# Patient Record
Sex: FEMALE | Race: WHITE | NOT HISPANIC OR LATINO | Employment: UNEMPLOYED | URBAN - METROPOLITAN AREA
[De-identification: names, ages, dates, MRNs, and addresses within clinical notes are randomized per-mention and may not be internally consistent; named-entity substitution may affect disease eponyms.]

---

## 2018-02-01 ENCOUNTER — HOSPITAL ENCOUNTER (EMERGENCY)
Facility: HOSPITAL | Age: 28
Discharge: HOME | End: 2018-02-01
Attending: EMERGENCY MEDICINE

## 2018-02-01 VITALS
TEMPERATURE: 98 F | HEIGHT: 63 IN | WEIGHT: 224 LBS | OXYGEN SATURATION: 100 % | HEART RATE: 102 BPM | SYSTOLIC BLOOD PRESSURE: 128 MMHG | DIASTOLIC BLOOD PRESSURE: 74 MMHG | RESPIRATION RATE: 20 BRPM | BODY MASS INDEX: 39.69 KG/M2

## 2018-02-01 DIAGNOSIS — L02.411 ABSCESS OF RIGHT AXILLA: Primary | ICD-10-CM

## 2018-02-01 LAB
B-HCG UR QL: NEGATIVE
CTP QC/QA: YES

## 2018-02-01 PROCEDURE — 99284 EMERGENCY DEPT VISIT MOD MDM: CPT | Mod: 25

## 2018-02-01 PROCEDURE — 81025 URINE PREGNANCY TEST: CPT | Performed by: EMERGENCY MEDICINE

## 2018-02-01 PROCEDURE — 25000003 PHARM REV CODE 250: Performed by: PHYSICIAN ASSISTANT

## 2018-02-01 PROCEDURE — 10061 I&D ABSCESS COMP/MULTIPLE: CPT

## 2018-02-01 RX ORDER — HYDROCODONE BITARTRATE AND ACETAMINOPHEN 5; 325 MG/1; MG/1
1 TABLET ORAL
Status: COMPLETED | OUTPATIENT
Start: 2018-02-01 | End: 2018-02-01

## 2018-02-01 RX ORDER — ONDANSETRON 4 MG/1
4 TABLET, ORALLY DISINTEGRATING ORAL
Status: COMPLETED | OUTPATIENT
Start: 2018-02-01 | End: 2018-02-01

## 2018-02-01 RX ORDER — SULFAMETHOXAZOLE AND TRIMETHOPRIM 800; 160 MG/1; MG/1
2 TABLET ORAL
Status: COMPLETED | OUTPATIENT
Start: 2018-02-01 | End: 2018-02-01

## 2018-02-01 RX ORDER — IBUPROFEN 600 MG/1
600 TABLET ORAL EVERY 6 HOURS PRN
Qty: 20 TABLET | Refills: 0 | Status: SHIPPED | OUTPATIENT
Start: 2018-02-01 | End: 2018-09-11

## 2018-02-01 RX ORDER — ONDANSETRON 4 MG/1
4 TABLET, ORALLY DISINTEGRATING ORAL EVERY 8 HOURS PRN
Qty: 15 TABLET | Refills: 0 | Status: SHIPPED | OUTPATIENT
Start: 2018-02-01 | End: 2018-09-11

## 2018-02-01 RX ORDER — HYDROCODONE BITARTRATE AND ACETAMINOPHEN 5; 325 MG/1; MG/1
1 TABLET ORAL EVERY 4 HOURS PRN
Qty: 12 TABLET | Refills: 0 | Status: SHIPPED | OUTPATIENT
Start: 2018-02-01 | End: 2018-02-11

## 2018-02-01 RX ORDER — SULFAMETHOXAZOLE AND TRIMETHOPRIM 800; 160 MG/1; MG/1
1 TABLET ORAL 2 TIMES DAILY
Qty: 12 TABLET | Refills: 0 | Status: SHIPPED | OUTPATIENT
Start: 2018-02-01 | End: 2018-02-07

## 2018-02-01 RX ORDER — LIDOCAINE HYDROCHLORIDE 10 MG/ML
10 INJECTION INFILTRATION; PERINEURAL
Status: COMPLETED | OUTPATIENT
Start: 2018-02-01 | End: 2018-02-01

## 2018-02-01 RX ADMIN — SULFAMETHOXAZOLE AND TRIMETHOPRIM 2 TABLET: 800; 160 TABLET ORAL at 06:02

## 2018-02-01 RX ADMIN — LIDOCAINE HYDROCHLORIDE 10 ML: 10 INJECTION, SOLUTION INFILTRATION; PERINEURAL at 06:02

## 2018-02-01 RX ADMIN — ONDANSETRON 4 MG: 4 TABLET, ORALLY DISINTEGRATING ORAL at 07:02

## 2018-02-01 RX ADMIN — HYDROCODONE BITARTRATE AND ACETAMINOPHEN 1 TABLET: 5; 325 TABLET ORAL at 06:02

## 2018-02-01 NOTE — ED PROVIDER NOTES
Encounter Date: 2/1/2018       History     Chief Complaint   Patient presents with    Abscess     Pt states abscess under right arm- large abscessn oted with pus under the skin, noticed 4 days ago, getting bigger and more painful, pt running fever.      Danya Chino 27 y.o. afebrile 27-year-old female with no reported past medical history presented to the ED with c/o abscess to the right axilla.  She states that she noted small painful region approximately 5 days ago.  She states since onset area has gradually increased in size and pain severity.  Does report some erythema to the right axilla in that pain is exacerbated with palpation and certain movements.  She does report due to pain she is unable to lower arm all the way as it exacerbates the pain but that she can complete this motion.  She denies any bleeding or drainage from the site.  She states minimal improvement with warm compress.  She does complain of subjective fever and chills however did not have any today.  She has not tried any medications for the symptoms.  She denies any previous history of skin abscesses.      The history is provided by the patient.     Review of patient's allergies indicates:  No Known Allergies  History reviewed. No pertinent past medical history.  No past surgical history on file.  History reviewed. No pertinent family history.  Social History   Substance Use Topics    Smoking status: Not on file    Smokeless tobacco: Not on file    Alcohol use Not on file     Review of Systems   Constitutional: Negative for chills and fever.   Cardiovascular: Negative for chest pain.   Gastrointestinal: Negative for nausea and vomiting.   Musculoskeletal: Negative for arthralgias, back pain and myalgias.   Skin: Positive for color change and wound. Negative for rash.        Abscess to the right axilla   Neurological: Negative for weakness and numbness.   Hematological: Does not bruise/bleed easily.       Physical Exam     Initial Vitals  [02/01/18 1639]   BP Pulse Resp Temp SpO2   128/74 102 20 98.4 °F (36.9 °C) 100 %      MAP       92         Physical Exam    Nursing note and vitals reviewed.  Constitutional: Vital signs are normal. She appears well-developed and well-nourished. She is cooperative.  Non-toxic appearance. She does not appear ill.   Mildly distressed due to pain   HENT:   Head: Normocephalic and atraumatic.   Eyes: Conjunctivae and lids are normal.   Neck: Neck supple. No neck rigidity.   Cardiovascular: Normal rate and regular rhythm.   Pulmonary/Chest: Breath sounds normal. No respiratory distress. She has no wheezes. She has no rhonchi.   Abdominal: Soft. Normal appearance. There is no rigidity.   Musculoskeletal: Normal range of motion.   Full passive range of motion of the right affected upper extremity.  3 cm area of erythema to the right axilla with central fluctuance and induration; purulent material noted just any skin with no active drainage, bleeding or extending erythema   Neurological: She is alert and oriented to person, place, and time. She has normal strength. No sensory deficit. GCS eye subscore is 4. GCS verbal subscore is 5. GCS motor subscore is 6.   Skin: Skin is warm, dry and intact. Abscess noted. No rash noted. There is erythema.   Psychiatric: She has a normal mood and affect. Her speech is normal and behavior is normal. Thought content normal.         ED Course   I & D - Incision and Drainage  Date/Time: 2/1/2018 7:03 PM  Location procedure was performed: [facility] EMERGENCY DEPARTMENT  Performed by: NIKOLAY NEVAREZ  Authorized by: HUE WEAVER   Assisting provider: HUE WEAVER  Pre-operative diagnosis: abscess  Post-operative diagnosis: abscess  Consent Done: Yes  Consent: Verbal consent obtained.  Risks and benefits: risks, benefits and alternatives were discussed  Consent given by: patient  Patient understanding: patient states understanding of the procedure being performed  Type:  abscess  Body area: upper extremity (right axilla)  Anesthesia: local infiltration    Anesthesia:  Local Anesthetic: lidocaine 1% without epinephrine  Anesthetic total: 2 mL  Patient sedated: no  Scalpel size: 11  Incision type: single straight  Complexity: complex  Drainage: purulent  Drainage amount: copious  Wound treatment: incision,  drainage,  deloculation,  wound left open and  wound packed  Packing material: 1/4 in gauze  Patient tolerance: Patient tolerated the procedure well with no immediate complications        Labs Reviewed   POCT URINE PREGNANCY   Danya Chino 27 y.o. afebrile 27-year-old female with no reported past medical history presented to the ED with c/o abscess to the right axilla.  She states that she noted small painful region approximately 5 days ago.  She states since onset area has gradually increased in size and pain severity.  Does report some erythema to the right axilla in that pain is exacerbated with palpation and certain movements.  She does report due to pain she is unable to lower arm all the way as it exacerbates the pain but that she can complete this motion.  She denies any bleeding or drainage from the site.  She states minimal improvement with warm compress.  She does complain of subjective fever and chills however did not have any today.  She has not tried any medications for the symptoms.  She denies any previous history of skin abscesses. ROS positive for abscess.  Physical exam reveals patient in some mild distress due to pain.  3 cm area of erythema to the right axilla with central fluctuance and induration; purulent material noted just any skin with no active drainage, bleeding or extending erythema.  FROM of all joints on affected extremities, sensation and capillary refill intact.     DDX: abscess, cellulitis, folliculitis, hydradenitis     ED management: wound cleaned I&D; see procedure note. We will send patient home with ABX, NSAIDs and short course of pain  medication.  She was instructed to remove packing in 2 days and follow-up with family practice for further evaluation.      Impression/Plan: The encounter diagnosis was Abscess of right axilla. discharged with Bactrim, Motrin and Norco Patient will follow up with Primary for wound check in 2-3 days.  Patient cautioned on when to return to ED.  Pt. Understands and agrees with current treatment plan                           Attending Attestation:     Physician Attestation Statement for NP/PA:   I discussed this assessment and plan of this patient with the NP/PA, but I did not personally examine the patient. The face to face encounter was performed by the NP/PA.                  ED Course      Clinical Impression:   The encounter diagnosis was Abscess of right axilla.    Disposition:   Disposition: Discharged  Condition: Stable                        KEVIN Santillan  02/01/18 1905       Mandy Kwong MD  02/01/18 1104

## 2018-08-13 ENCOUNTER — HOSPITAL ENCOUNTER (EMERGENCY)
Facility: HOSPITAL | Age: 28
Discharge: HOME | End: 2018-08-14
Attending: EMERGENCY MEDICINE
Payer: MEDICAID

## 2018-08-13 DIAGNOSIS — O20.0 THREATENED ABORTION IN SECOND TRIMESTER: Primary | ICD-10-CM

## 2018-08-13 DIAGNOSIS — N93.9 VAGINAL BLEEDING: ICD-10-CM

## 2018-08-13 LAB
B-HCG UR QL: POSITIVE
BASOPHILS # BLD AUTO: 0.01 K/UL
BASOPHILS NFR BLD: 0.1 %
BILIRUB UR QL STRIP: NEGATIVE
CLARITY UR: CLEAR
COLOR UR: YELLOW
CTP QC/QA: YES
DIFFERENTIAL METHOD: ABNORMAL
EOSINOPHIL # BLD AUTO: 0.2 K/UL
EOSINOPHIL NFR BLD: 1.9 %
ERYTHROCYTE [DISTWIDTH] IN BLOOD BY AUTOMATED COUNT: 14.4 %
GLUCOSE SERPL-MCNC: 119 MG/DL (ref 70–110)
GLUCOSE UR QL STRIP: NEGATIVE
HCT VFR BLD AUTO: 38.1 %
HGB BLD-MCNC: 12.4 G/DL
HGB UR QL STRIP: NEGATIVE
KETONES UR QL STRIP: NEGATIVE
LEUKOCYTE ESTERASE UR QL STRIP: NEGATIVE
LYMPHOCYTES # BLD AUTO: 2.1 K/UL
LYMPHOCYTES NFR BLD: 20.6 %
MCH RBC QN AUTO: 24.9 PG
MCHC RBC AUTO-ENTMCNC: 32.5 G/DL
MCV RBC AUTO: 77 FL
MONOCYTES # BLD AUTO: 0.6 K/UL
MONOCYTES NFR BLD: 5.5 %
NEUTROPHILS # BLD AUTO: 7.4 K/UL
NEUTROPHILS NFR BLD: 71.4 %
NITRITE UR QL STRIP: NEGATIVE
PH UR STRIP: 6 [PH] (ref 5–8)
PLATELET # BLD AUTO: 154 K/UL
PMV BLD AUTO: 11.1 FL
PROT UR QL STRIP: NEGATIVE
RBC # BLD AUTO: 4.97 M/UL
SP GR UR STRIP: 1.02 (ref 1–1.03)
URN SPEC COLLECT METH UR: NORMAL
UROBILINOGEN UR STRIP-ACNC: NEGATIVE EU/DL
WBC # BLD AUTO: 10.33 K/UL

## 2018-08-13 PROCEDURE — 82962 GLUCOSE BLOOD TEST: CPT

## 2018-08-13 PROCEDURE — 81003 URINALYSIS AUTO W/O SCOPE: CPT

## 2018-08-13 PROCEDURE — 81025 URINE PREGNANCY TEST: CPT | Performed by: EMERGENCY MEDICINE

## 2018-08-13 PROCEDURE — 85025 COMPLETE CBC W/AUTO DIFF WBC: CPT

## 2018-08-13 PROCEDURE — 86901 BLOOD TYPING SEROLOGIC RH(D): CPT

## 2018-08-13 PROCEDURE — 84702 CHORIONIC GONADOTROPIN TEST: CPT

## 2018-08-13 PROCEDURE — 99284 EMERGENCY DEPT VISIT MOD MDM: CPT | Mod: 25

## 2018-08-14 VITALS
BODY MASS INDEX: 42.52 KG/M2 | TEMPERATURE: 99 F | OXYGEN SATURATION: 99 % | HEART RATE: 100 BPM | SYSTOLIC BLOOD PRESSURE: 134 MMHG | DIASTOLIC BLOOD PRESSURE: 63 MMHG | RESPIRATION RATE: 20 BRPM | HEIGHT: 63 IN | WEIGHT: 240 LBS

## 2018-08-14 LAB
ABO + RH BLD: NORMAL
HCG INTACT+B SERPL-ACNC: NORMAL MIU/ML

## 2018-08-14 NOTE — ED NOTES
Patient changed into gown. Updated her on lab resulting times and let her know an Vericept tech would be called in.

## 2018-08-14 NOTE — ED PROVIDER NOTES
Encounter Date: 8/13/2018    SCRIBE #1 NOTE: I, Eliecer Leblanc, am scribing for, and in the presence of,  Dr. Derrick Larsen. I have scribed the entire note.       History     Chief Complaint   Patient presents with    Fall     Patient presents to the ED with reports of having falled while walking outside. States having felt dizzy. Reports landing on the right side of her body. Now complains of having abdominal pain with spotting. States having had a +positive home pregnancy test. No visit with OB/GYN.     Abdominal Pain     Danya Chino is a 28 y.o. female who  has no past medical history on file.    Patient presents to the ED due to slip and fall at [facility] in the last couple hours onto pavement with pink vaginal discharge later. She had a positive home pregnancy test two weeks prior, has not yet seen OB. Pt reports some mild myalgias immediately following the fall, but an hour later noticed some pinkish discharge while wiping after urinating. Currently experiencing cramping in the mid and lower abdomen. Pt is currently lightheaded, was lightheaded before fall as well. Pt had gestational diabetes during her last pregnancy.         The history is provided by the patient.     Review of patient's allergies indicates:  No Known Allergies  History reviewed. No pertinent past medical history.  History reviewed. No pertinent surgical history.  History reviewed. No pertinent family history.  Social History     Tobacco Use    Smoking status: Not on file   Substance Use Topics    Alcohol use: Not on file    Drug use: Not on file     Review of Systems   Genitourinary: Positive for vaginal bleeding.   Musculoskeletal: Positive for myalgias.        R sided   Neurological: Positive for dizziness and light-headedness.   All other systems reviewed and are negative.      Physical Exam     Initial Vitals [08/13/18 2216]   BP Pulse Resp Temp SpO2   136/79 (!) 111 20 98.6 °F (37 °C) 100 %      MAP       --         Physical  Exam    Nursing note and vitals reviewed.  Constitutional: She appears well-developed and well-nourished. No distress.   HENT:   Head: Normocephalic and atraumatic.   Eyes: Conjunctivae are normal.   Neck: Normal range of motion.   Cardiovascular: Normal rate, regular rhythm and normal heart sounds.   No murmur heard.  Pulmonary/Chest: Breath sounds normal. No respiratory distress.   Abdominal: Bowel sounds are normal. She exhibits no distension.   suprapubic and RLQ tenderness   Musculoskeletal: Normal range of motion.   mild R flank tenderness   Neurological: She is alert and oriented to person, place, and time.   Skin: Skin is warm and dry.   Psychiatric: She has a normal mood and affect. Her behavior is normal.         ED Course   Procedures  Labs Reviewed   CBC W/ AUTO DIFFERENTIAL - Abnormal; Notable for the following components:       Result Value    MCV 77 (*)     MCH 24.9 (*)     All other components within normal limits   POCT URINE PREGNANCY - Abnormal; Notable for the following components:    POC Preg Test, Ur Positive (*)     All other components within normal limits   POCT GLUCOSE MONITORING CONTINUOUS - Abnormal; Notable for the following components:    POC Glucose 119 (*)     All other components within normal limits   URINALYSIS   HCG, QUANTITATIVE, PREGNANCY   GROUP & RH          Imaging Results          US OB Limited 1 Or More Gestations (Final result)  Result time 08/14/18 00:30:09   Procedure changed from US OB Less Than 14 Wks First Gestation     Final result by Abhijeet Medina MD (08/14/18 00:30:09)                 Impression:      Single live intrauterine gestation corresponding to 15 weeks and 4 days with expected date of delivery of 01/31/2019.  No complications noted.      Electronically signed by: Abhijeet Medina MD  Date:    08/14/2018  Time:    00:30             Narrative:    EXAMINATION:  US OB LIMITED 1 OR MORE GESTATIONS    CLINICAL HISTORY:  pain;  Abnormal uterine and vaginal bleeding,  unspecified    TECHNIQUE:  Limited transabdominal pelvic ultrasound was performed.    COMPARISON:  None    FINDINGS:  There is a single live intrauterine gestation with current cephalic presentation.  The average ultrasound age is 15 weeks and 4 days.  The expected date of delivery is 2019.  The assessed fetal biometrics including the biparietal diameter, head circumference, abdominal circumference, and femur length are concordant.  The fetal weight is 127 g.  The fetal heart rate is 167 beats per minute.    The placenta is located anteriorly.  The placenta is normal in appearance.  The amniotic fluid appears adequate on visual assessment.  The cervix is closed.  The cervical length measures 3.9 cm.                                 Medical Decision Making:   Initial Assessment:   Patient is a 28 y.o. female presenting to ED with slip and fall with pinkish vaginal discharge developing, possibly pregnant.   Exam with suprapubic and RLQ tenderness, R flank tenderness.  Plan to obtain urine pregnancy, UA, glucose, orthostatics.  Will continue to monitor closely and reassess.    Clinical Tests:   Lab Tests: Ordered and Reviewed  Radiological Study: Ordered and Reviewed  ED Management:  28-year-old female who fell this evening.  Patient then noticed vaginal spotting.  UPT was positive and an ultrasound was done showing a 15 week 4 day IUP with no abnormalities on a good heart beat.  The patient is Rh positive. I have supplied up with information to follow up with an obstetrician as soon as able to establish care.                      Clinical Impression:   The primary encounter diagnosis was Threatened  in second trimester. A diagnosis of Vaginal bleeding was also pertinent to this visit.         I, Dr. Derrick Larsen, personally performed the services described in this documentation. All medical record entries made by the scribe were at my direction and in my presence. I have reviewed the chart and agree  that the record reflects my personal performance and is accurate and complete. Derrick Cash MD.  1:12 AM 08/14/2018                      Derrick Cash MD  08/14/18 0113

## 2018-08-14 NOTE — ED NOTES
Orthostatics    Lying /63    Sitting  /67    Stand  /61      Dizziness stated from sitting to standing

## 2018-08-14 NOTE — ED TRIAGE NOTES
Patient presents to the ED with complaints of a fall outside of [facility]. Patient stated there was water on the ground and that's what made her fall. Patient states she fell on her right side. Reports two episodes at home of light spotting. She is pregnant and has not have her first OB visit yet. This is her 3rd pregnancy with the other 2 being healthy deliveries. As a result of the fall patient states she has been having intermittent cramping and some dizziness. She states she started to feel dizzy before the fall when she found out she was pregnant. Patient started on prenatal vitamins 2 days ago. Patient requests an US.     Review of patient's allergies indicates:  No Known Allergies     Patient has verified the spelling of their name and  on armband.   APPEARANCE: Patient is alert, calm, oriented x 4, and does not appear distressed.  HEENT: intermittent dizziness that started when she found out she was pregnant  SKIN: Skin is normal for race, warm, and dry. Normal skin turgor and mucous membranes moist.  CARDIAC: Normal rate and rhythm, no murmur heard.   RESPIRATORY:Normal rate and effort. Breath sounds clear bilaterally throughout chest. Respirations are equal and unlabored.    GASTRO: Bowel sounds normal, abdomen is soft, no tenderness, and no abdominal distention.  MUSCLE: Full ROM. No bony tenderness or soft tissue tenderness. No obvious deformity. Intermittent lower and upper mid abdominal cramping +unknown amount of weeks pregnant

## 2018-09-11 ENCOUNTER — OFFICE VISIT (OUTPATIENT)
Dept: OBSTETRICS AND GYNECOLOGY | Facility: CLINIC | Age: 28
End: 2018-09-11
Payer: MEDICAID

## 2018-09-11 ENCOUNTER — LAB VISIT (OUTPATIENT)
Dept: LAB | Facility: HOSPITAL | Age: 28
End: 2018-09-11
Attending: OBSTETRICS & GYNECOLOGY
Payer: MEDICAID

## 2018-09-11 VITALS — WEIGHT: 258.38 LBS | BODY MASS INDEX: 45.78 KG/M2 | HEIGHT: 63 IN

## 2018-09-11 DIAGNOSIS — Z32.00 ENCOUNTER FOR CONFIRMATION OF PREGNANCY TEST RESULT WITH PHYSICAL EXAMINATION: ICD-10-CM

## 2018-09-11 DIAGNOSIS — Z3A.19 19 WEEKS GESTATION OF PREGNANCY: Primary | ICD-10-CM

## 2018-09-11 DIAGNOSIS — Z36.3 ENCOUNTER FOR ROUTINE SCREENING FOR MALFORMATION USING ULTRASONICS: ICD-10-CM

## 2018-09-11 DIAGNOSIS — Z3A.19 19 WEEKS GESTATION OF PREGNANCY: ICD-10-CM

## 2018-09-11 LAB
ABO + RH BLD: NORMAL
BASOPHILS # BLD AUTO: 0.01 K/UL
BASOPHILS NFR BLD: 0.1 %
BLD GP AB SCN CELLS X3 SERPL QL: NORMAL
DIFFERENTIAL METHOD: ABNORMAL
EOSINOPHIL # BLD AUTO: 0.2 K/UL
EOSINOPHIL NFR BLD: 2.2 %
ERYTHROCYTE [DISTWIDTH] IN BLOOD BY AUTOMATED COUNT: 14.6 %
ESTIMATED AVG GLUCOSE: 117 MG/DL
GLUCOSE SERPL-MCNC: 109 MG/DL
HBA1C MFR BLD HPLC: 5.7 %
HCT VFR BLD AUTO: 38.2 %
HGB BLD-MCNC: 12.5 G/DL
LYMPHOCYTES # BLD AUTO: 1.8 K/UL
LYMPHOCYTES NFR BLD: 17.7 %
MCH RBC QN AUTO: 24.9 PG
MCHC RBC AUTO-ENTMCNC: 32.7 G/DL
MCV RBC AUTO: 76 FL
MONOCYTES # BLD AUTO: 0.5 K/UL
MONOCYTES NFR BLD: 4.5 %
NEUTROPHILS # BLD AUTO: 7.7 K/UL
NEUTROPHILS NFR BLD: 74.5 %
PLATELET # BLD AUTO: 190 K/UL
PMV BLD AUTO: 10.9 FL
RBC # BLD AUTO: 5.02 M/UL
TSH SERPL DL<=0.005 MIU/L-ACNC: 0.8 UIU/ML
WBC # BLD AUTO: 10.34 K/UL

## 2018-09-11 PROCEDURE — 36415 COLL VENOUS BLD VENIPUNCTURE: CPT

## 2018-09-11 PROCEDURE — 85025 COMPLETE CBC W/AUTO DIFF WBC: CPT

## 2018-09-11 PROCEDURE — 87340 HEPATITIS B SURFACE AG IA: CPT

## 2018-09-11 PROCEDURE — 83036 HEMOGLOBIN GLYCOSYLATED A1C: CPT

## 2018-09-11 PROCEDURE — 86592 SYPHILIS TEST NON-TREP QUAL: CPT

## 2018-09-11 PROCEDURE — 86850 RBC ANTIBODY SCREEN: CPT

## 2018-09-11 PROCEDURE — 86703 HIV-1/HIV-2 1 RESULT ANTBDY: CPT

## 2018-09-11 PROCEDURE — 84443 ASSAY THYROID STIM HORMONE: CPT

## 2018-09-11 PROCEDURE — 99204 OFFICE O/P NEW MOD 45 MIN: CPT | Mod: TH,S$PBB,, | Performed by: OBSTETRICS & GYNECOLOGY

## 2018-09-11 PROCEDURE — 99999 PR PBB SHADOW E&M-EST. PATIENT-LVL II: CPT | Mod: PBBFAC,,, | Performed by: OBSTETRICS & GYNECOLOGY

## 2018-09-11 PROCEDURE — 82947 ASSAY GLUCOSE BLOOD QUANT: CPT

## 2018-09-11 PROCEDURE — 86762 RUBELLA ANTIBODY: CPT

## 2018-09-11 PROCEDURE — 99212 OFFICE O/P EST SF 10 MIN: CPT | Mod: PBBFAC,PO,25 | Performed by: OBSTETRICS & GYNECOLOGY

## 2018-09-11 NOTE — PROGRESS NOTES
"28 y.o.   OB History      Para Term  AB Living    1              SAB TAB Ectopic Multiple Live Births                     Comlaining of: new pregnancy  unkown lmp  lucita from us in ED,   Baby 1 vag, 6.5 my  Baby 2, emebergency section for fhr, gest dm , 6.5 my    pmh neg, on gest dm diet only  psh C secotn x 1  Ros neg  Sh neg        ROS:  GENERAL: No fever, chills, fatigability or weight loss.  SKIN: No rashes, itching or changes in color or texture of skin.  HEAD: No headaches or recent head trauma.  EYES: Visual acuity fine. No photophobia, ocular pain or diplopia.  EARS: Denies ear pain, discharge or vertigo.  NOSE: No loss of smell, no epistaxis or postnasal drip.  MOUTH & THROAT: No hoarseness or change in voice. No excessive gum bleeding.  NODES: Denies swollen glands.  CHEST: Denies DALY, cyanosis, wheezing, cough and sputum production.  CARDIOVASCULAR: Denies chest pain, PND, orthopnea or reduced exercise tolerance.  ABDOMEN: Appetite fine. No weight loss. Denies diarrhea, abdominal pain, hematemesis or blood in stool.  URINARY: No flank pain, dysuria or hematuria.  PERIPHERAL VASCULAR: No claudication or cyanosis.  MUSCULOSKELETAL: No joint stiffness or swelling. Denies back pain.  NEUROLOGIC: No history of seizures, paralysis, alteration of gait or coordination      PE: Ht 5' 3" (1.6 m)   Wt 117.2 kg (258 lb 6.1 oz)   BMI 45.77 kg/m²    Head/neck normal  abd soft  fht good  Fh 20  extr normal    A new pregnancy  Plan, discussed prenatal care  counselled on diet/excer  pnv  Lab,        "

## 2018-09-12 ENCOUNTER — TELEPHONE (OUTPATIENT)
Dept: OBSTETRICS AND GYNECOLOGY | Facility: CLINIC | Age: 28
End: 2018-09-12

## 2018-09-12 LAB
HBV SURFACE AG SERPL QL IA: NEGATIVE
HIV 1+2 AB+HIV1 P24 AG SERPL QL IA: NEGATIVE
RPR SER QL: NORMAL
RUBV IGG SER-ACNC: 35.4 IU/ML
RUBV IGG SER-IMP: REACTIVE

## 2018-09-12 NOTE — TELEPHONE ENCOUNTER
Tried contacting patient. A male answered the phone and stated that she was unable to speak but would give a message to call back.

## 2018-09-12 NOTE — TELEPHONE ENCOUNTER
----- Message from Hanane Bowles sent at 9/12/2018  2:20 PM CDT -----  Contact: Self/ [phone]  Patient called in today expressing her concerns with her office visit yesterday.   She stated she was not told how much she weights, status of the baby and wasn't given a follow up appointment. I apologized to the patient and offered to schedule a follow up appointment.     Patient stated she wants a follow up appointment this week and not 3 weeks later. She also said she was informed by the doctor that she needed a anatomy ultrasound but no one scheduled her a appointment.    Please call patient to schedule a sooner appointment. She is looking to schedule a follow up appointment and ultrasound. She is 5 months pregnant.

## 2018-09-14 ENCOUNTER — TELEPHONE (OUTPATIENT)
Dept: OBSTETRICS AND GYNECOLOGY | Facility: CLINIC | Age: 28
End: 2018-09-14

## 2018-09-14 NOTE — TELEPHONE ENCOUNTER
----- Message from Saba Brothers sent at 9/14/2018  1:39 PM CDT -----  Contact: Self [phone]  Patient is calling to see when does she have to follow up for a Dr's visit and for her ultrasound since she is 5 months. Please advice

## 2018-09-14 NOTE — TELEPHONE ENCOUNTER
Spoke to pt who could not understand why she was not scheduled for her ultrasound immediately after her appointment. She feels like she should have left with paperwork and information and a due date. I tried to advise pt that we cannot know a due date without an ultrasound. Pt couldn't understand so pt was scheduled for 9/19 at 7:30AM for u/s

## 2018-09-20 ENCOUNTER — TELEPHONE (OUTPATIENT)
Dept: OBSTETRICS AND GYNECOLOGY | Facility: CLINIC | Age: 28
End: 2018-09-20

## 2018-09-20 ENCOUNTER — PROCEDURE VISIT (OUTPATIENT)
Dept: OBSTETRICS AND GYNECOLOGY | Facility: CLINIC | Age: 28
End: 2018-09-20
Payer: MEDICAID

## 2018-09-20 DIAGNOSIS — O99.212 MATERNAL OBESITY, ANTEPARTUM, SECOND TRIMESTER: Primary | ICD-10-CM

## 2018-09-20 DIAGNOSIS — Z36.3 ANTENATAL SCREENING FOR MALFORMATION USING ULTRASONICS: ICD-10-CM

## 2018-09-20 DIAGNOSIS — Z3A.20 20 WEEKS GESTATION OF PREGNANCY: Primary | ICD-10-CM

## 2018-09-20 DIAGNOSIS — Z3A.19 19 WEEKS GESTATION OF PREGNANCY: ICD-10-CM

## 2018-09-20 PROCEDURE — 76805 OB US >/= 14 WKS SNGL FETUS: CPT | Mod: PBBFAC,PO

## 2018-09-20 PROCEDURE — 76805 OB US >/= 14 WKS SNGL FETUS: CPT | Mod: 26,S$PBB,, | Performed by: OBSTETRICS & GYNECOLOGY

## 2018-09-20 NOTE — TELEPHONE ENCOUNTER
Tell her I looked at the us report, we are going to use the due date from the us done already in the ED, that's jan 31  Make appt for her in 3 weeks  But I want her to go sometime next week to get all of her blood work so we can have it when she comes back  thanks

## 2018-10-11 ENCOUNTER — PROCEDURE VISIT (OUTPATIENT)
Dept: OBSTETRICS AND GYNECOLOGY | Facility: CLINIC | Age: 28
End: 2018-10-11
Payer: MEDICAID

## 2018-10-11 ENCOUNTER — ROUTINE PRENATAL (OUTPATIENT)
Dept: OBSTETRICS AND GYNECOLOGY | Facility: CLINIC | Age: 28
End: 2018-10-11
Payer: MEDICAID

## 2018-10-11 VITALS
BODY MASS INDEX: 45.83 KG/M2 | SYSTOLIC BLOOD PRESSURE: 133 MMHG | DIASTOLIC BLOOD PRESSURE: 74 MMHG | WEIGHT: 258.69 LBS

## 2018-10-11 DIAGNOSIS — Z3A.24 24 WEEKS GESTATION OF PREGNANCY: ICD-10-CM

## 2018-10-11 DIAGNOSIS — O99.810 ABNORMAL GLUCOSE TOLERANCE TEST IN PREGNANCY: Primary | ICD-10-CM

## 2018-10-11 DIAGNOSIS — Z3A.20 20 WEEKS GESTATION OF PREGNANCY: ICD-10-CM

## 2018-10-11 PROCEDURE — 99213 OFFICE O/P EST LOW 20 MIN: CPT | Mod: S$PBB,TH,25, | Performed by: OBSTETRICS & GYNECOLOGY

## 2018-10-11 PROCEDURE — 99999 PR PBB SHADOW E&M-EST. PATIENT-LVL II: CPT | Mod: PBBFAC,,, | Performed by: OBSTETRICS & GYNECOLOGY

## 2018-10-11 PROCEDURE — 76816 OB US FOLLOW-UP PER FETUS: CPT | Mod: 26,S$PBB,, | Performed by: OBSTETRICS & GYNECOLOGY

## 2018-10-11 PROCEDURE — 76816 OB US FOLLOW-UP PER FETUS: CPT | Mod: PBBFAC,PO

## 2018-10-11 PROCEDURE — 99212 OFFICE O/P EST SF 10 MIN: CPT | Mod: PBBFAC,TH,PO | Performed by: OBSTETRICS & GYNECOLOGY

## 2018-10-25 ENCOUNTER — TELEPHONE (OUTPATIENT)
Dept: OBSTETRICS AND GYNECOLOGY | Facility: CLINIC | Age: 28
End: 2018-10-25

## 2018-10-25 NOTE — TELEPHONE ENCOUNTER
----- Message from Katerine Chino sent at 10/25/2018  3:30 PM CDT -----  Contact: [phone]/self  Patient requesting to speak with you regarding her glucose test and next appt. Please advise.

## 2018-11-05 ENCOUNTER — TELEPHONE (OUTPATIENT)
Dept: OBSTETRICS AND GYNECOLOGY | Facility: CLINIC | Age: 28
End: 2018-11-05

## 2018-11-05 ENCOUNTER — HOSPITAL ENCOUNTER (OUTPATIENT)
Facility: HOSPITAL | Age: 28
Discharge: HOME | End: 2018-11-05
Attending: OBSTETRICS & GYNECOLOGY | Admitting: OBSTETRICS & GYNECOLOGY
Payer: MEDICAID

## 2018-11-05 VITALS
OXYGEN SATURATION: 100 % | BODY MASS INDEX: 45.71 KG/M2 | HEIGHT: 63 IN | WEIGHT: 258 LBS | DIASTOLIC BLOOD PRESSURE: 87 MMHG | HEART RATE: 104 BPM | SYSTOLIC BLOOD PRESSURE: 145 MMHG | TEMPERATURE: 99 F

## 2018-11-05 DIAGNOSIS — Z36.89 ENCOUNTER FOR TRIAGE IN PREGNANT PATIENT: ICD-10-CM

## 2018-11-05 LAB
BACTERIA #/AREA URNS HPF: ABNORMAL /HPF
BASOPHILS # BLD AUTO: 0.01 K/UL
BASOPHILS NFR BLD: 0.1 %
BILIRUB UR QL STRIP: NEGATIVE
CLARITY UR: CLEAR
COLOR UR: ABNORMAL
DIFFERENTIAL METHOD: ABNORMAL
EOSINOPHIL # BLD AUTO: 0 K/UL
EOSINOPHIL NFR BLD: 0.5 %
ERYTHROCYTE [DISTWIDTH] IN BLOOD BY AUTOMATED COUNT: 14.3 %
FLUAV AG SPEC QL IA: NEGATIVE
FLUBV AG SPEC QL IA: NEGATIVE
GLUCOSE UR QL STRIP: NEGATIVE
HCT VFR BLD AUTO: 35.5 %
HGB BLD-MCNC: 11.2 G/DL
HGB UR QL STRIP: ABNORMAL
KETONES UR QL STRIP: NEGATIVE
LEUKOCYTE ESTERASE UR QL STRIP: NEGATIVE
LYMPHOCYTES # BLD AUTO: 1.3 K/UL
LYMPHOCYTES NFR BLD: 17.2 %
MCH RBC QN AUTO: 24.4 PG
MCHC RBC AUTO-ENTMCNC: 31.5 G/DL
MCV RBC AUTO: 77 FL
MICROSCOPIC COMMENT: ABNORMAL
MONOCYTES # BLD AUTO: 0.4 K/UL
MONOCYTES NFR BLD: 5.4 %
NEUTROPHILS # BLD AUTO: 5.6 K/UL
NEUTROPHILS NFR BLD: 75.7 %
NITRITE UR QL STRIP: NEGATIVE
PH UR STRIP: 6 [PH] (ref 5–8)
PLATELET # BLD AUTO: 164 K/UL
PMV BLD AUTO: 10.5 FL
PROT UR QL STRIP: NEGATIVE
RBC # BLD AUTO: 4.59 M/UL
RBC #/AREA URNS HPF: 8 /HPF (ref 0–4)
SP GR UR STRIP: >=1.03 (ref 1–1.03)
SPECIMEN SOURCE: NORMAL
SQUAMOUS #/AREA URNS HPF: 12 /HPF
URN SPEC COLLECT METH UR: ABNORMAL
UROBILINOGEN UR STRIP-ACNC: NEGATIVE EU/DL
WBC # BLD AUTO: 7.39 K/UL
WBC #/AREA URNS HPF: 1 /HPF (ref 0–5)

## 2018-11-05 PROCEDURE — 99219 PR INITIAL OBSERVATION CARE,LEVL II: CPT | Mod: ,,, | Performed by: OBSTETRICS & GYNECOLOGY

## 2018-11-05 PROCEDURE — 36415 COLL VENOUS BLD VENIPUNCTURE: CPT

## 2018-11-05 PROCEDURE — 99211 OFF/OP EST MAY X REQ PHY/QHP: CPT

## 2018-11-05 PROCEDURE — 81000 URINALYSIS NONAUTO W/SCOPE: CPT

## 2018-11-05 PROCEDURE — 87400 INFLUENZA A/B EACH AG IA: CPT

## 2018-11-05 PROCEDURE — 25000003 PHARM REV CODE 250: Performed by: OBSTETRICS & GYNECOLOGY

## 2018-11-05 PROCEDURE — 85025 COMPLETE CBC W/AUTO DIFF WBC: CPT

## 2018-11-05 RX ORDER — NITROFURANTOIN 25; 75 MG/1; MG/1
100 CAPSULE ORAL ONCE
Status: COMPLETED | OUTPATIENT
Start: 2018-11-05 | End: 2018-11-05

## 2018-11-05 RX ADMIN — NITROFURANTOIN (MONOHYDRATE/MACROCRYSTALS) 100 MG: 75; 25 CAPSULE ORAL at 09:11

## 2018-11-05 NOTE — TELEPHONE ENCOUNTER
----- Message from Hanane Bowles sent at 11/5/2018 12:16 PM CST -----  Contact: Self/ [phone]  Patient asked to be seen today. She stated she thinks she's 7 months pregnant and has been running a fever since last night.    Please call.

## 2018-11-05 NOTE — TELEPHONE ENCOUNTER
Pt states since last night she has been having fever of 102 and chills. She has been getting pressure in her legs and in her abdomen. Pt advised to go tot he ED

## 2018-11-06 ENCOUNTER — TELEPHONE (OUTPATIENT)
Dept: OBSTETRICS AND GYNECOLOGY | Facility: CLINIC | Age: 28
End: 2018-11-06

## 2018-11-06 RX ORDER — NITROFURANTOIN 25; 75 MG/1; MG/1
100 CAPSULE ORAL 2 TIMES DAILY
Qty: 10 CAPSULE | Refills: 1 | Status: SHIPPED | OUTPATIENT
Start: 2018-11-06 | End: 2018-11-20

## 2018-11-06 NOTE — CARE UPDATE
: Patient is a  @ 37.4 weeks IUP. Arrived to unit c/o fever with no symptoms and abdominal cramps. VSS, NAD.    : Patient's cervix is closed.    : Dr. Wiedemann at bedside.     : Urine collected for u/a. CBC ordered.    : Order for rapid flu swab per Dr. Wiedemann.    : Rapid flu collected and sent.    : Dr. Wiedemann updated on patient status. Ordered macrobid 100 mg x1. Patient can  rx for macrobid tomorrow. Orders to discharge patient.    : Patient given macrobid per MAR.    : Rapid flu test negative.     : Discharge instructions given to patient via printed materials and verbal discussion. Patient verbalized understanding. VSS, NAD. Patient leaving unit via ambulation.

## 2018-11-06 NOTE — PROGRESS NOTES
27 y/o  at 27-4, presents to [facility] fever, irreg crmaps, no bleeding or lof.  Baby is active, last visit oct 11, uncomplicated prenatal care,   No cough, some aches, nobody else with fever in household  No diarrhea or dysuria    pe vss, t 99  Pt does not look ill  Head/neck normal  abd gravid, nontender all quadrants, no rebound  nontender over section scar  extr normal  Pelvic per rn, high, post, closed, no fluid or blood    Monitor shows reactive fhr with accelerations per protocol for reactivity  No decels  No reg contractions    Assessment, poss viral illness  Plan, cbc, send ua, oral hydrate

## 2018-11-13 ENCOUNTER — LAB VISIT (OUTPATIENT)
Dept: LAB | Facility: HOSPITAL | Age: 28
End: 2018-11-13
Attending: OBSTETRICS & GYNECOLOGY
Payer: MEDICAID

## 2018-11-13 ENCOUNTER — TELEPHONE (OUTPATIENT)
Dept: OBSTETRICS AND GYNECOLOGY | Facility: CLINIC | Age: 28
End: 2018-11-13

## 2018-11-13 DIAGNOSIS — Z3A.24 24 WEEKS GESTATION OF PREGNANCY: ICD-10-CM

## 2018-11-13 LAB
GLUCOSE SERPL-MCNC: 118 MG/DL
GLUCOSE SERPL-MCNC: 149 MG/DL
GLUCOSE SERPL-MCNC: 216 MG/DL
GLUCOSE SERPL-MCNC: 216 MG/DL

## 2018-11-13 PROCEDURE — 82951 GLUCOSE TOLERANCE TEST (GTT): CPT

## 2018-11-13 PROCEDURE — 82952 GTT-ADDED SAMPLES: CPT

## 2018-11-13 PROCEDURE — 36415 COLL VENOUS BLD VENIPUNCTURE: CPT

## 2018-11-13 NOTE — TELEPHONE ENCOUNTER
----- Message from Saba Brothers sent at 11/13/2018 10:15 AM CST -----  Contact: Self [phone]  Patient is 7 month pregnant and missed her appointment today and she states if she can still come in. Please advice

## 2018-11-15 ENCOUNTER — TELEPHONE (OUTPATIENT)
Dept: OBSTETRICS AND GYNECOLOGY | Facility: CLINIC | Age: 28
End: 2018-11-15

## 2018-11-15 DIAGNOSIS — O24.419 GESTATIONAL DIABETES MELLITUS (GDM) IN THIRD TRIMESTER, GESTATIONAL DIABETES METHOD OF CONTROL UNSPECIFIED: Primary | ICD-10-CM

## 2018-11-15 NOTE — TELEPHONE ENCOUNTER
Her fasting and 1, 2 hr glucose were elevated, needs to see mfm soon!, even if has to go to [place] for eval/consult for gest DM

## 2018-11-16 ENCOUNTER — TELEPHONE (OUTPATIENT)
Dept: OBSTETRICS AND GYNECOLOGY | Facility: CLINIC | Age: 28
End: 2018-11-16

## 2018-11-16 ENCOUNTER — TELEPHONE (OUTPATIENT)
Dept: MATERNAL FETAL MEDICINE | Facility: CLINIC | Age: 28
End: 2018-11-16

## 2018-11-16 NOTE — TELEPHONE ENCOUNTER
"Contacted patient to schedule M consult for GDM. Patient initially stated that she was not sure "why MFM was contacting her." Informed patient that RN would reach out to the OB office so that patient can discuss results with her provider. Message sent to the OB provider office, patient was contacted and then message received by MFM that patient waiting for call to schedule consult.    Offered patient appointment at [facility], discussed the location and patient declined stating "that is too far away." RN then offered patient the next available consult appointment with MFM at [facility] location on 11/29/18 and patient stated that she "did not know if that is where I go to the doctor." Reviewed the [place] address with the patient at which point she said, "I do not know if that is it or where that is." Patient asking "why I need to wait 2 weeks for an appointment." RN mentioned coming to the [facility] location next week and patient declined. Patient concerned about the possibility of her blood sugar "being high right now." Informed patient that RN will reach out to the on-call for her OB's group and patient said "no, don't send anymore messages." Patient eventually scheduled the consult at [facility] on 11/29/18 for 1040am, encouraged patient to bring in her BS log.    Pt verbalized understanding of information.    "

## 2018-11-16 NOTE — TELEPHONE ENCOUNTER
"----- Message from Sheeba Grace RN sent at 11/16/2018  3:25 PM CST -----  Manny Sotelo,    I contacted the patient to schedule her MFM consult for GDM and pt was upset said "she has not spoken to her doctor, did not know these results and people are calling her to schedule things she does not know anything about." Can someone from your office call the patient and explain. I told her I would call back later and schedule a consult.    Sheeba Griffiths  "

## 2018-11-16 NOTE — TELEPHONE ENCOUNTER
I sent a message to Dr. Wiedemann to see when he want her to come in. Dr. Cancino usually schedule 4 weeks visits until they are further along. i'm going to see if Dr. Cancino want her to come in next week.

## 2018-11-19 ENCOUNTER — TELEPHONE (OUTPATIENT)
Dept: OBSTETRICS AND GYNECOLOGY | Facility: CLINIC | Age: 28
End: 2018-11-19

## 2018-11-19 NOTE — TELEPHONE ENCOUNTER
----- Message from Fabiola Louise sent at 11/19/2018  1:59 PM CST -----  Contact: Self [phone]  Patient would like to speak with you about her appointment tomorrow. Please advise

## 2018-11-20 ENCOUNTER — ROUTINE PRENATAL (OUTPATIENT)
Dept: OBSTETRICS AND GYNECOLOGY | Facility: CLINIC | Age: 28
End: 2018-11-20
Payer: MEDICAID

## 2018-11-20 VITALS
SYSTOLIC BLOOD PRESSURE: 114 MMHG | WEIGHT: 263.56 LBS | BODY MASS INDEX: 46.69 KG/M2 | DIASTOLIC BLOOD PRESSURE: 66 MMHG

## 2018-11-20 DIAGNOSIS — Z3A.29 29 WEEKS GESTATION OF PREGNANCY: Primary | ICD-10-CM

## 2018-11-20 PROCEDURE — 99999 PR PBB SHADOW E&M-EST. PATIENT-LVL II: CPT | Mod: PBBFAC,,, | Performed by: OBSTETRICS & GYNECOLOGY

## 2018-11-20 PROCEDURE — 99213 OFFICE O/P EST LOW 20 MIN: CPT | Mod: S$PBB,TH,, | Performed by: OBSTETRICS & GYNECOLOGY

## 2018-11-20 PROCEDURE — 99212 OFFICE O/P EST SF 10 MIN: CPT | Mod: PBBFAC,PO | Performed by: OBSTETRICS & GYNECOLOGY

## 2018-11-20 NOTE — PROGRESS NOTES
Baby active, fht good, 145, fh 30  Pt states she is watching her sugars, has appt Monday with diabetic RN  Then wed with mfm  Fu 2 weeks, appt given  Some mild discomfort R side low, prob rd lig or section scar  Not acute  Discussed tubal ligatoin, pt not sure as of yet  Her partner does't want it done,

## 2018-11-27 ENCOUNTER — CLINICAL SUPPORT (OUTPATIENT)
Dept: DIABETES | Facility: CLINIC | Age: 28
End: 2018-11-27
Payer: MEDICAID

## 2018-11-27 VITALS — WEIGHT: 260 LBS | BODY MASS INDEX: 46.06 KG/M2

## 2018-11-27 DIAGNOSIS — O24.410 DIET CONTROLLED GESTATIONAL DIABETES MELLITUS (GDM) IN THIRD TRIMESTER: ICD-10-CM

## 2018-11-27 PROCEDURE — 99211 OFF/OP EST MAY X REQ PHY/QHP: CPT | Mod: PBBFAC,PO | Performed by: REGISTERED NURSE

## 2018-11-27 PROCEDURE — 99999 PR PBB SHADOW E&M-EST. PATIENT-LVL I: CPT | Mod: PBBFAC,,,

## 2018-11-27 PROCEDURE — G0108 DIAB MANAGE TRN  PER INDIV: HCPCS | Mod: PBBFAC,PO | Performed by: REGISTERED NURSE

## 2018-11-27 NOTE — PROGRESS NOTES
Diabetes Education  Author: Aunel Menendez RN  Date: 11/27/2018    Diabetes Care Management Summary  Diabetes Education Record Assessment/Progress: Initial  Current Diabetes Risk Level: Moderate   Last A1c:   Lab Results   Component Value Date    HGBA1C 5.7 (H) 09/11/2018     Last visit with Diabetes Educator: : 11/27/2018  Diabetes Type  Diabetes Type : Gestational  Diabetes History  Current Treatment: Diet, Exercise  Reviewed Problem List with Patient: No  Health Maintenance was reviewed today with patient. Discussed with patient importance of routine eye exams, foot exams/foot care, blood work (i.e.: A1c, microalbumin, and lipid), dental visits, yearly flu vaccine, and pneumonia vaccine as indicated by PCP. Patient verbalized understanding.     The patient has no Health Maintenance topics of status Not Due  Health Maintenance Due   Topic Date Due    Lipid Panel  1990    TETANUS VACCINE  07/20/2008    Pap Smear  07/20/2011    Influenza Vaccine  08/01/2018   Nutrition  Meal Planning: 3 meals per day, snacks between meal  What type of beverages do you drink?: water  Meal Plan 24 Hour Recall - Breakfast: eggs, milk  Meal Plan 24 Hour Recall - Lunch: turkey and cheese sandwich, 1/2 orange  Meal Plan 24 Hour Recall - Dinner: pasta, milk  Monitoring   Self Monitoring : pt was given a one touch verio flex meter and instructed in its use. Instructed pt to test 4 times a day - fasting in the am and 2 hours after breakfast, lunch and dinner. Instructed pt on the normal bs ranges. instructed pt to keep a record of her bs's and to bring the record to her md visits. Pt will fu with md to get testing supplies ordered.  Do you use a personal continuous glucose monitor?: No  In the last month, how often have you had a low blood sugar reaction?: more than once a week  What are your symptoms of low blood sugar?: sweating, dizzy  How do you treat low blood sugar?: eats something or drinks water  Can you tell when your  blood sugar is too high?: no  Exercise   Exercise Type: none  Current Diabetes Treatment   Current Treatment: Diet, Exercise  Social History  Preferred Learning Method: Face to Face, Demonstration, Reading Materials  Primary Support: Self  Educational Level: Middle School  Smoking Status: Never a Smoker  Alcohol Use: Never  DDS-2 Score  ( > 3 = SIGNIFICANT DISTRESS): 1   Barriers to Change  Barriers to Change: None  Learning Challenges : Literacy  Readiness to Learn   Readiness to Learn : Acceptance  Cultural Influences  Cultural Influences: None  Diabetes Education Assessment/Progress  Diabetes Disease Process (diabetes disease process and treatment options): Discussion, Instructed, Demonstrates Understanding/Competency(verbalizes/demonstrates), Individual Session, Written Materials Provided  Nutrition (Incorporating nutritional management into one's lifestyle): Discussion, Instructed, Demonstrates Understanding/Competency (verbalizes/demonstrates), Demonstration, Individual Session, Written Materials Provided  Physical Activity (incorporating physical activity into one's lifestyle): Discussion, Instructed, Demonstrates Understanding/Competency (verbalizes/demonstrates), Individual Session  Medications (states correct name, dose, onset, peak, duration, side effects & timing of meds): Not Covered/Deferred  Monitoring (monitoring blood glucose/other parameters & using results): Discussion, Demonstration, Instructed, Individual Session, Written Materials Provided, Demonstrates Understanding/Competency (verbalizes/demonstrates)  Acute Complications (preventing, detecting, and treating acute complications): Not Covered/Deferred  Chronic Complications (preventing, detecting, and treating chronic complications): Not Covered/Deferred  Clinical (diabetes, other pertinent medical history, and relevant comorbidities reviewed during visit): Discussion, Instructed, Demonstrates Understanding/Competency  (verbalizes/demonstrates), Individual Session  Cognitive (knowledge of self-management skills, functional health literacy): Discussion, Instructed, Demonstrates Understanding/Competency (verbalizes/demonstrates), Individual Session  Psychosocial (emotional response to diabetes): Discussion, Instructed, Demonstrates Understanding/Competency (verbalizes/demonstrates), Individual Session  Diabetes Distress and Support Systems: Discussion, Instructed, Demonstrates Understanding/Competency (verbalizes/demonstrates), Individual Session  Behavioral (readiness for change, lifestyle practices, self-care behaviors): Discussion, Instructed, Demonstrates Understanding/Competency (verbalizes/demonstrates), Individual Session  Goals  Patient has selected/evaluated goals during today's session: No  Diabetes Meal Plan  Restrictions: Restricted Carbohydrate  Calories: 2000  Carbohydrate Per Meal: 45-60g  Carbohydrate Per Snack : 15-30g  Pt came to clinic for gdm education. Pt had gestational diabetes with her last pregnancy. Instructed pt on the food groups, how to read labels and count carbs. Instructed pt on the meal plan with 3 meals and 3 snacks per day. Pt was given sample menus and meal plans as examples. Discussed with pt how to measure and put her meals together. Pt had good understanding of meal plan and compliance is expected. Pt was instructed to call for any problems or questions.  Today's Self-Management Care Plan was developed with the patient's input and is based on barriers identified during today's assessment.    The long and short-term goals in the care plan were written with the patient/caregiver's input. The patient has agreed to work toward these goals to improve her overall diabetes control.      The patient received a copy of today's self-management plan and verbalized understanding of the care plan, goals, and all of today's instructions.      The patient was encouraged to communicate with her physician and  care team regarding her condition(s) and treatment.  I provided the patient with my contact information today and encouraged her to contact me via phone or patient portal as needed.     Education Units of Time   Time Spent: 30 min

## 2018-12-03 ENCOUNTER — HOSPITAL ENCOUNTER (OUTPATIENT)
Facility: HOSPITAL | Age: 28
Discharge: HOME | End: 2018-12-03
Attending: OBSTETRICS & GYNECOLOGY | Admitting: OBSTETRICS & GYNECOLOGY
Payer: MEDICAID

## 2018-12-03 VITALS — HEIGHT: 63 IN | WEIGHT: 263 LBS | BODY MASS INDEX: 46.6 KG/M2 | RESPIRATION RATE: 20 BRPM

## 2018-12-03 DIAGNOSIS — O46.90 VAGINAL BLEEDING IN PREGNANCY: ICD-10-CM

## 2018-12-03 LAB — FIBRONECTIN FETAL SPEC QL: NEGATIVE

## 2018-12-03 PROCEDURE — 99211 OFF/OP EST MAY X REQ PHY/QHP: CPT | Mod: 25

## 2018-12-03 PROCEDURE — 82731 ASSAY OF FETAL FIBRONECTIN: CPT

## 2018-12-03 RX ORDER — ACETAMINOPHEN 500 MG
500 TABLET ORAL EVERY 6 HOURS PRN
Status: DISCONTINUED | OUTPATIENT
Start: 2018-12-03 | End: 2018-12-04 | Stop reason: HOSPADM

## 2018-12-03 RX ORDER — TERBUTALINE SULFATE 1 MG/ML
0.25 INJECTION SUBCUTANEOUS ONCE
Status: DISCONTINUED | OUTPATIENT
Start: 2018-12-03 | End: 2018-12-04 | Stop reason: HOSPADM

## 2018-12-03 RX ORDER — FAMOTIDINE 10 MG/ML
INJECTION INTRAVENOUS
Status: DISCONTINUED
Start: 2018-12-03 | End: 2018-12-04 | Stop reason: HOSPADM

## 2018-12-03 RX ORDER — BUTORPHANOL TARTRATE 2 MG/ML
2 INJECTION INTRAMUSCULAR; INTRAVENOUS ONCE
Status: DISCONTINUED | OUTPATIENT
Start: 2018-12-03 | End: 2018-12-04 | Stop reason: HOSPADM

## 2018-12-03 RX ORDER — ONDANSETRON 8 MG/1
8 TABLET, ORALLY DISINTEGRATING ORAL EVERY 8 HOURS PRN
Status: DISCONTINUED | OUTPATIENT
Start: 2018-12-03 | End: 2018-12-04 | Stop reason: HOSPADM

## 2018-12-03 RX ORDER — SODIUM CITRATE AND CITRIC ACID MONOHYDRATE 334; 500 MG/5ML; MG/5ML
SOLUTION ORAL
Status: DISCONTINUED
Start: 2018-12-03 | End: 2018-12-04 | Stop reason: HOSPADM
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
## 2018-12-04 NOTE — PROGRESS NOTES
12/03/18 2057   [facility] Note - Strip   Strip Reviewed by [facility] Nurse? Yes   [facility] Note - Communication   [place] Nurse Communicated with Bedside Nurse Regarding: Fetal Status   [facility] Note - Notification   Nurse Notified? Yes   Name of Nurse stanford rn @bs to adj efm,per jose on phone   Doctor Notified? No
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
## 2018-12-07 ENCOUNTER — TELEPHONE (OUTPATIENT)
Dept: OBSTETRICS AND GYNECOLOGY | Facility: CLINIC | Age: 28
End: 2018-12-07

## 2018-12-07 NOTE — TELEPHONE ENCOUNTER
----- Message from Katerine Chino sent at 12/7/2018  1:59 PM CST -----  Contact: [phone]/self  Patient requesting to speak with you regarding rescheduling her missed appts. Please advise.

## 2018-12-11 ENCOUNTER — TELEPHONE (OUTPATIENT)
Dept: OBSTETRICS AND GYNECOLOGY | Facility: CLINIC | Age: 28
End: 2018-12-11

## 2018-12-11 NOTE — TELEPHONE ENCOUNTER
Pt wants to know when can she come in to see you for her ob visit. Pt state she didn't have a ride to see you on 12/6. Pt is schedule for MFM on Thursday the 13th

## 2018-12-11 NOTE — TELEPHONE ENCOUNTER
----- Message from Lauren Huber sent at 12/11/2018  4:06 PM CST -----  Contact: self/[phone]  She missed her appointment for her routine OB on 12/6 and ultrasound appointment; she needs to get that rescheduled asap.

## 2018-12-13 ENCOUNTER — PROCEDURE VISIT (OUTPATIENT)
Dept: MATERNAL FETAL MEDICINE | Facility: HOSPITAL | Age: 28
End: 2018-12-13
Payer: MEDICAID

## 2018-12-13 VITALS — BODY MASS INDEX: 46.06 KG/M2 | SYSTOLIC BLOOD PRESSURE: 134 MMHG | WEIGHT: 260 LBS | DIASTOLIC BLOOD PRESSURE: 80 MMHG

## 2018-12-13 DIAGNOSIS — Z36.3 ANTENATAL SCREENING FOR MALFORMATION USING ULTRASONICS: ICD-10-CM

## 2018-12-13 DIAGNOSIS — Z36.89 ENCOUNTER FOR ULTRASOUND TO CHECK FETAL GROWTH: Primary | ICD-10-CM

## 2018-12-13 DIAGNOSIS — O24.419 GESTATIONAL DIABETES MELLITUS (GDM) IN THIRD TRIMESTER, GESTATIONAL DIABETES METHOD OF CONTROL UNSPECIFIED: ICD-10-CM

## 2018-12-13 PROCEDURE — 76811 OB US DETAILED SNGL FETUS: CPT

## 2018-12-13 PROCEDURE — 76811 OB US DETAILED SNGL FETUS: CPT | Mod: 26,,, | Performed by: OBSTETRICS & GYNECOLOGY

## 2018-12-13 PROCEDURE — 99203 OFFICE O/P NEW LOW 30 MIN: CPT | Mod: TH,25,, | Performed by: OBSTETRICS & GYNECOLOGY

## 2018-12-17 ENCOUNTER — ROUTINE PRENATAL (OUTPATIENT)
Dept: OBSTETRICS AND GYNECOLOGY | Facility: CLINIC | Age: 28
End: 2018-12-17
Payer: MEDICAID

## 2018-12-17 VITALS
SYSTOLIC BLOOD PRESSURE: 118 MMHG | BODY MASS INDEX: 46.75 KG/M2 | WEIGHT: 263.88 LBS | DIASTOLIC BLOOD PRESSURE: 80 MMHG

## 2018-12-17 DIAGNOSIS — Z3A.33 33 WEEKS GESTATION OF PREGNANCY: Primary | ICD-10-CM

## 2018-12-17 PROCEDURE — 99999 PR PBB SHADOW E&M-EST. PATIENT-LVL II: CPT | Mod: PBBFAC,,, | Performed by: OBSTETRICS & GYNECOLOGY

## 2018-12-17 PROCEDURE — 99212 OFFICE O/P EST SF 10 MIN: CPT | Mod: PBBFAC,PO | Performed by: OBSTETRICS & GYNECOLOGY

## 2018-12-17 PROCEDURE — 99213 OFFICE O/P EST LOW 20 MIN: CPT | Mod: TH,S$PBB,, | Performed by: OBSTETRICS & GYNECOLOGY

## 2018-12-17 RX ORDER — BLOOD-GLUCOSE METER
EACH MISCELLANEOUS
COMMUNITY
Start: 2018-12-11 | End: 2019-07-19
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
## 2018-12-26 ENCOUNTER — HOSPITAL ENCOUNTER (EMERGENCY)
Facility: HOSPITAL | Age: 28
Discharge: HOME | End: 2018-12-27
Attending: EMERGENCY MEDICINE
Payer: MEDICAID

## 2018-12-26 DIAGNOSIS — J06.9 URI WITH COUGH AND CONGESTION: Primary | ICD-10-CM

## 2018-12-26 DIAGNOSIS — R07.9 CHEST PAIN: ICD-10-CM

## 2018-12-26 LAB
ALBUMIN SERPL BCP-MCNC: 2.4 G/DL
ALP SERPL-CCNC: 112 U/L
ALT SERPL W/O P-5'-P-CCNC: 12 U/L
ANION GAP SERPL CALC-SCNC: 8 MMOL/L
AST SERPL-CCNC: 10 U/L
BASOPHILS # BLD AUTO: 0 K/UL
BASOPHILS NFR BLD: 0 %
BILIRUB SERPL-MCNC: 0.2 MG/DL
BNP SERPL-MCNC: 14 PG/ML
BUN SERPL-MCNC: 4 MG/DL
CALCIUM SERPL-MCNC: 8.9 MG/DL
CHLORIDE SERPL-SCNC: 108 MMOL/L
CO2 SERPL-SCNC: 21 MMOL/L
CREAT SERPL-MCNC: 0.7 MG/DL
DIFFERENTIAL METHOD: ABNORMAL
EOSINOPHIL # BLD AUTO: 0.2 K/UL
EOSINOPHIL NFR BLD: 2.3 %
ERYTHROCYTE [DISTWIDTH] IN BLOOD BY AUTOMATED COUNT: 14.7 %
EST. GFR  (AFRICAN AMERICAN): >60 ML/MIN/1.73 M^2
EST. GFR  (NON AFRICAN AMERICAN): >60 ML/MIN/1.73 M^2
FLUAV AG SPEC QL IA: NEGATIVE
FLUBV AG SPEC QL IA: NEGATIVE
GLUCOSE SERPL-MCNC: 98 MG/DL
HCT VFR BLD AUTO: 36 %
HGB BLD-MCNC: 11.2 G/DL
LYMPHOCYTES # BLD AUTO: 1.6 K/UL
LYMPHOCYTES NFR BLD: 17.3 %
MCH RBC QN AUTO: 23.6 PG
MCHC RBC AUTO-ENTMCNC: 31.1 G/DL
MCV RBC AUTO: 76 FL
MONOCYTES # BLD AUTO: 0.7 K/UL
MONOCYTES NFR BLD: 7.9 %
NEUTROPHILS # BLD AUTO: 6.5 K/UL
NEUTROPHILS NFR BLD: 71.5 %
PLATELET # BLD AUTO: 172 K/UL
PMV BLD AUTO: 10.9 FL
POCT GLUCOSE: 97 MG/DL (ref 70–110)
POTASSIUM SERPL-SCNC: 3.5 MMOL/L
PROT SERPL-MCNC: 6.5 G/DL
RBC # BLD AUTO: 4.75 M/UL
SODIUM SERPL-SCNC: 137 MMOL/L
SPECIMEN SOURCE: NORMAL
TROPONIN I SERPL DL<=0.01 NG/ML-MCNC: <0.006 NG/ML
WBC # BLD AUTO: 9.07 K/UL

## 2018-12-26 PROCEDURE — 99284 EMERGENCY DEPT VISIT MOD MDM: CPT | Mod: 25

## 2018-12-26 PROCEDURE — 96360 HYDRATION IV INFUSION INIT: CPT

## 2018-12-26 PROCEDURE — 80053 COMPREHEN METABOLIC PANEL: CPT

## 2018-12-26 PROCEDURE — 81003 URINALYSIS AUTO W/O SCOPE: CPT

## 2018-12-26 PROCEDURE — 82962 GLUCOSE BLOOD TEST: CPT

## 2018-12-26 PROCEDURE — 93010 ELECTROCARDIOGRAM REPORT: CPT | Mod: ,,, | Performed by: STUDENT IN AN ORGANIZED HEALTH CARE EDUCATION/TRAINING PROGRAM

## 2018-12-26 PROCEDURE — 93005 ELECTROCARDIOGRAM TRACING: CPT

## 2018-12-26 PROCEDURE — 84484 ASSAY OF TROPONIN QUANT: CPT

## 2018-12-26 PROCEDURE — 83880 ASSAY OF NATRIURETIC PEPTIDE: CPT

## 2018-12-26 PROCEDURE — 87400 INFLUENZA A/B EACH AG IA: CPT | Mod: 59

## 2018-12-26 PROCEDURE — 85025 COMPLETE CBC W/AUTO DIFF WBC: CPT

## 2018-12-26 PROCEDURE — 25000003 PHARM REV CODE 250: Performed by: PHYSICIAN ASSISTANT

## 2018-12-26 RX ADMIN — SODIUM CHLORIDE 1000 ML: 0.9 INJECTION, SOLUTION INTRAVENOUS at 10:12

## 2018-12-27 VITALS
HEART RATE: 98 BPM | DIASTOLIC BLOOD PRESSURE: 71 MMHG | TEMPERATURE: 98 F | RESPIRATION RATE: 20 BRPM | WEIGHT: 250 LBS | BODY MASS INDEX: 44.29 KG/M2 | OXYGEN SATURATION: 99 % | SYSTOLIC BLOOD PRESSURE: 119 MMHG

## 2018-12-27 LAB
BILIRUB UR QL STRIP: NEGATIVE
CLARITY UR: CLEAR
COLOR UR: ABNORMAL
GLUCOSE UR QL STRIP: NEGATIVE
HGB UR QL STRIP: NEGATIVE
KETONES UR QL STRIP: ABNORMAL
LEUKOCYTE ESTERASE UR QL STRIP: NEGATIVE
NITRITE UR QL STRIP: NEGATIVE
PH UR STRIP: 7 [PH] (ref 5–8)
PROT UR QL STRIP: ABNORMAL
SP GR UR STRIP: 1.02 (ref 1–1.03)
URN SPEC COLLECT METH UR: ABNORMAL
UROBILINOGEN UR STRIP-ACNC: NEGATIVE EU/DL

## 2018-12-27 NOTE — ED TRIAGE NOTES
Pt presents to ED and reports flu like symptoms. Pt states cough and nasal congestion x2 days with subjective fever this am and took Tylenol at 1300 . Pt states she is between 34-35 weeks pregnant and is seen by Dr. Silverman and reports she has gestational diabetes.

## 2018-12-27 NOTE — ED PROVIDER NOTES
Encounter Date: 2018       History     Chief Complaint   Patient presents with    URI     cold symptoms x 2 days and reports fever this am; pt is pregnant and ob is dr dunn and has not contacted md---- pt reports has gestational diabetes; pt took tylenol at 1 pm for fever and no fever since then     28-year-old  female at approximately 35 weeks gestation presents the ED with complaints of cough and congestion times 2-3 days.  Also complains of subjective fever this morning for which she took Tylenol, shortness of breath and chest pain primarily when coughing x today.  Patient states she has gestational diabetes.  Denies wheezing, abdominal pain, vaginal bleeding or discharge, dysuria.      The history is provided by the patient. No  was used.     Review of patient's allergies indicates:  No Known Allergies  Past Medical History:   Diagnosis Date    Gestational diabetes      Past Surgical History:   Procedure Laterality Date     SECTION      CHOLECYSTECTOMY       Family History   Problem Relation Age of Onset    Diabetes Maternal Grandmother      Social History     Tobacco Use    Smoking status: Never Smoker    Smokeless tobacco: Never Used   Substance Use Topics    Alcohol use: No     Frequency: Never    Drug use: No     Review of Systems   HENT: Positive for congestion, rhinorrhea, sinus pressure and sinus pain. Negative for ear pain and sore throat.    Respiratory: Positive for cough. Negative for shortness of breath and wheezing.    Cardiovascular: Positive for chest pain.   Gastrointestinal: Negative for abdominal pain, nausea and vomiting.   Genitourinary: Negative for dysuria.   Musculoskeletal: Negative for myalgias.       Physical Exam     Initial Vitals [18]   BP Pulse Resp Temp SpO2   119/65 108 20 98.4 °F (36.9 °C) 98 %      MAP       --         Physical Exam    Nursing note and vitals reviewed.  Constitutional: Vital signs are normal. She  appears well-developed and well-nourished. She appears ill. No distress.   HENT:   Head: Normocephalic and atraumatic.   Right Ear: Tympanic membrane normal.   Left Ear: Tympanic membrane normal.   Nose: Mucosal edema present.   Mouth/Throat: Uvula is midline, oropharynx is clear and moist and mucous membranes are normal.   Eyes: Conjunctivae and lids are normal.   Neck: Normal range of motion and phonation normal.   Cardiovascular: Normal rate, regular rhythm and normal heart sounds.   Pulmonary/Chest: Effort normal and breath sounds normal. She has no wheezes. She has no rales. She exhibits tenderness.       Patient states increased pain with deep breath   Abdominal: Soft. Normal appearance and bowel sounds are normal. She exhibits distension (consistent with 35 weeks gestation). There is no tenderness.   Musculoskeletal: Normal range of motion.   Neurological: She is alert and oriented to person, place, and time.   Skin: Skin is warm and dry.   Psychiatric: She has a normal mood and affect. Her speech is normal and behavior is normal. Judgment and thought content normal. Cognition and memory are normal.         ED Course   Procedures  Labs Reviewed   CBC W/ AUTO DIFFERENTIAL - Abnormal; Notable for the following components:       Result Value    Hemoglobin 11.2 (*)     Hematocrit 36.0 (*)     MCV 76 (*)     MCH 23.6 (*)     MCHC 31.1 (*)     RDW 14.7 (*)     Lymph% 17.3 (*)     All other components within normal limits   COMPREHENSIVE METABOLIC PANEL - Abnormal; Notable for the following components:    CO2 21 (*)     BUN, Bld 4 (*)     Albumin 2.4 (*)     All other components within normal limits   URINALYSIS, REFLEX TO URINE CULTURE - Abnormal; Notable for the following components:    Color, UA Brown (*)     Protein, UA Trace (*)     Ketones, UA 1+ (*)     All other components within normal limits    Narrative:     Preferred Collection Type->Urine, Clean Catch   INFLUENZA A AND B ANTIGEN   TROPONIN I   B-TYPE  NATRIURETIC PEPTIDE   POCT GLUCOSE          Imaging Results    None          Medical Decision Making:   Initial Assessment:   28-year-old  female at 35 weeks gestation with cough, congestion x2 days and subjective fever, shortness of breath, and chest pain times today.  Lungs clear bilaterally with no wheezes or rales.  Mild upper midsternal chest tenderness. Nasal mucosal edema. Patient states increased pain with deep breath. Abdomen is distended consistent with 35 weeks gestation, nontender. Patient appears ill but in no acute distress, nontoxic appearing, afebrile, vital signs stable.  Differential Diagnosis:   Differential Diagnosis includes, but is not limited to:  Viral URI, Strep pharyngitis, viral pharyngitis, foreign body aspiration/ingestion, bronchitis, asthma exacerbation, CHF exacerbation, COPD exacerbation, allergy/atopy, influenza, pertussis, PE, pneumonia, lung abscess, fungal infection, TB, epiglottitis.  Clinical Tests:   Lab Tests: Ordered and Reviewed  ED Management:  Labs, IV fluids ordered.  Microcytic anemia noted. Trace protein and 1+ ketones.  Flu negative. All other labs grossly unremarkable.  Patient will be discharged with instructions to continue taking Tylenol as needed for fever and pain and increase oral hydration.  Instructed to follow up with her OB or PCP as soon as possible.  Return to ED with any new or worsening symptoms. Patient states understanding and agreement with treatment plan  Other:   I have discussed this case with another health care provider.                      Clinical Impression:   The primary encounter diagnosis was URI with cough and congestion. A diagnosis of Chest pain was also pertinent to this visit.                             Falguni Lyman PA-C  [phone]

## 2018-12-27 NOTE — DISCHARGE INSTRUCTIONS
Continue taking Tylenol for fever. You can take Zarbees for symptomatic relief of cough and congestion
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
## 2018-12-28 ENCOUNTER — TELEPHONE (OUTPATIENT)
Dept: OBSTETRICS AND GYNECOLOGY | Facility: CLINIC | Age: 28
End: 2018-12-28

## 2018-12-31 DIAGNOSIS — Z34.90 PREGNANCY, UNSPECIFIED GESTATIONAL AGE: Primary | ICD-10-CM

## 2019-01-03 ENCOUNTER — TELEPHONE (OUTPATIENT)
Dept: OBSTETRICS AND GYNECOLOGY | Facility: CLINIC | Age: 29
End: 2019-01-03

## 2019-01-03 NOTE — TELEPHONE ENCOUNTER
She didn't show up on 31 for her visit or ultrasound  Can come see me Monday at 10, but us is booked, thanks

## 2019-01-04 ENCOUNTER — TELEPHONE (OUTPATIENT)
Dept: OBSTETRICS AND GYNECOLOGY | Facility: CLINIC | Age: 29
End: 2019-01-04

## 2019-01-04 NOTE — TELEPHONE ENCOUNTER
----- Message from Hanane Bowles sent at 1/4/2019  3:35 PM CST -----  Contact: Self/ [phone]  Patient would like to be seen sooner than the next available appointment for a routine OB    Please call and advise.

## 2019-01-04 NOTE — TELEPHONE ENCOUNTER
Pt would like to be seen sooner than the first available.pt states she has been having contractions for 2 days. She was advised to go to L&D. Pt verbalized understanding

## 2019-01-08 ENCOUNTER — TELEPHONE (OUTPATIENT)
Dept: OBSTETRICS AND GYNECOLOGY | Facility: CLINIC | Age: 29
End: 2019-01-08

## 2019-01-10 ENCOUNTER — HOSPITAL ENCOUNTER (OUTPATIENT)
Dept: OBSTETRICS AND GYNECOLOGY | Facility: HOSPITAL | Age: 29
Discharge: HOME | End: 2019-01-10
Attending: OBSTETRICS & GYNECOLOGY
Payer: MEDICAID

## 2019-01-10 ENCOUNTER — ROUTINE PRENATAL (OUTPATIENT)
Dept: OBSTETRICS AND GYNECOLOGY | Facility: CLINIC | Age: 29
End: 2019-01-10
Payer: MEDICAID

## 2019-01-10 VITALS — WEIGHT: 267 LBS | BODY MASS INDEX: 47.29 KG/M2

## 2019-01-10 DIAGNOSIS — Z3A.37 37 WEEKS GESTATION OF PREGNANCY: Primary | ICD-10-CM

## 2019-01-10 DIAGNOSIS — O24.419 GESTATIONAL DIABETES MELLITUS (GDM) IN THIRD TRIMESTER, GESTATIONAL DIABETES METHOD OF CONTROL UNSPECIFIED: Primary | ICD-10-CM

## 2019-01-10 PROCEDURE — 99999 PR PBB SHADOW E&M-EST. PATIENT-LVL II: ICD-10-PCS | Mod: PBBFAC,,, | Performed by: OBSTETRICS & GYNECOLOGY

## 2019-01-10 PROCEDURE — 99213 PR OFFICE/OUTPT VISIT, EST, LEVL III, 20-29 MIN: ICD-10-PCS | Mod: S$PBB,25,TH, | Performed by: OBSTETRICS & GYNECOLOGY

## 2019-01-10 PROCEDURE — 99999 PR PBB SHADOW E&M-EST. PATIENT-LVL II: CPT | Mod: PBBFAC,,, | Performed by: OBSTETRICS & GYNECOLOGY

## 2019-01-10 PROCEDURE — 99213 OFFICE O/P EST LOW 20 MIN: CPT | Mod: S$PBB,25,TH, | Performed by: OBSTETRICS & GYNECOLOGY

## 2019-01-10 PROCEDURE — 59025 FETAL NON-STRESS TEST: CPT

## 2019-01-10 PROCEDURE — 59025 FETAL NON-STRESS TEST: CPT | Mod: 26,,, | Performed by: OBSTETRICS & GYNECOLOGY

## 2019-01-10 PROCEDURE — 87081 CULTURE SCREEN ONLY: CPT

## 2019-01-10 PROCEDURE — 99212 OFFICE O/P EST SF 10 MIN: CPT | Mod: PBBFAC,25,PO | Performed by: OBSTETRICS & GYNECOLOGY

## 2019-01-10 PROCEDURE — 59025 PR FETAL 2N-STRESS TEST: ICD-10-PCS | Mod: 26,,, | Performed by: OBSTETRICS & GYNECOLOGY

## 2019-01-10 NOTE — PROGRESS NOTES
Pt had nst today, all good per rn  bp 121 / 75  Baby is actvie, cx dusty closed  Discussed pro and con of  vs section  willus  for wt,  Wants tubal  Poss secton ?

## 2019-01-14 ENCOUNTER — PROCEDURE VISIT (OUTPATIENT)
Dept: OBSTETRICS AND GYNECOLOGY | Facility: CLINIC | Age: 29
End: 2019-01-14
Payer: MEDICAID

## 2019-01-14 DIAGNOSIS — Z34.90 PREGNANCY, UNSPECIFIED GESTATIONAL AGE: ICD-10-CM

## 2019-01-14 DIAGNOSIS — O99.213 MATERNAL OBESITY, ANTEPARTUM, THIRD TRIMESTER: ICD-10-CM

## 2019-01-14 DIAGNOSIS — O24.419 GESTATIONAL DIABETES: Primary | ICD-10-CM

## 2019-01-14 DIAGNOSIS — Z36.89 ENCOUNTER FOR ULTRASOUND TO CHECK FETAL GROWTH: ICD-10-CM

## 2019-01-14 LAB — BACTERIA SPEC AEROBE CULT: NORMAL

## 2019-01-14 PROCEDURE — 76816 OB US FOLLOW-UP PER FETUS: CPT | Mod: PBBFAC,PO

## 2019-01-14 PROCEDURE — 76819 FETAL BIOPHYS PROFIL W/O NST: CPT | Mod: PBBFAC,PO

## 2019-01-14 PROCEDURE — 76819 PR US, OB, FETAL BIOPHYSICAL, W/O NST: ICD-10-PCS | Mod: 26,S$PBB,, | Performed by: OBSTETRICS & GYNECOLOGY

## 2019-01-14 PROCEDURE — 76816 OB US FOLLOW-UP PER FETUS: CPT | Mod: 26,S$PBB,, | Performed by: OBSTETRICS & GYNECOLOGY

## 2019-01-14 PROCEDURE — 76819 FETAL BIOPHYS PROFIL W/O NST: CPT | Mod: 26,S$PBB,, | Performed by: OBSTETRICS & GYNECOLOGY

## 2019-01-14 PROCEDURE — 76816 PR  US,PREGNANT UTERUS,F/U,TRANSABD APP: ICD-10-PCS | Mod: 26,S$PBB,, | Performed by: OBSTETRICS & GYNECOLOGY

## 2019-01-15 ENCOUNTER — TELEPHONE (OUTPATIENT)
Dept: OBSTETRICS AND GYNECOLOGY | Facility: CLINIC | Age: 29
End: 2019-01-15

## 2019-01-15 NOTE — TELEPHONE ENCOUNTER
- tell her the us was good   - she needs a NST on thur or Friday, must go!!  -see me Tuesday at 3pm next week

## 2019-01-15 NOTE — TELEPHONE ENCOUNTER
Left message for pt to return call. Pt need to be notified about appt on Thursday for nst at 1:00 and ob visit next Tuesday at 3.

## 2019-01-17 ENCOUNTER — TELEPHONE (OUTPATIENT)
Dept: OBSTETRICS AND GYNECOLOGY | Facility: CLINIC | Age: 29
End: 2019-01-17

## 2019-01-17 NOTE — TELEPHONE ENCOUNTER
----- Message from Camilla Smiley sent at 1/17/2019  4:02 PM CST -----  Contact: [phone]/ self   Patient requesting to speak with you regarding procedure scheduled for 1/24 as she has no knowledge of it. Please call to further discuss and advise.

## 2019-01-17 NOTE — TELEPHONE ENCOUNTER
Pt called to inquire about her . She states she received a message stating the section was booked for  and that she needed to call about a time. Pt notified we do not have definite times yet but we will most likely have that info on  when she comes in for her appt

## 2019-01-22 ENCOUNTER — HOSPITAL ENCOUNTER (OUTPATIENT)
Dept: OBSTETRICS AND GYNECOLOGY | Facility: HOSPITAL | Age: 29
Discharge: HOME | End: 2019-01-22
Attending: OBSTETRICS & GYNECOLOGY
Payer: MEDICAID

## 2019-01-22 ENCOUNTER — ROUTINE PRENATAL (OUTPATIENT)
Dept: OBSTETRICS AND GYNECOLOGY | Facility: CLINIC | Age: 29
End: 2019-01-22
Payer: MEDICAID

## 2019-01-22 VITALS
WEIGHT: 273.38 LBS | SYSTOLIC BLOOD PRESSURE: 134 MMHG | DIASTOLIC BLOOD PRESSURE: 76 MMHG | BODY MASS INDEX: 48.43 KG/M2

## 2019-01-22 DIAGNOSIS — O24.419 GESTATIONAL DIABETES MELLITUS (GDM) IN THIRD TRIMESTER, GESTATIONAL DIABETES METHOD OF CONTROL UNSPECIFIED: ICD-10-CM

## 2019-01-22 DIAGNOSIS — Z3A.39 39 WEEKS GESTATION OF PREGNANCY: ICD-10-CM

## 2019-01-22 DIAGNOSIS — Z3A.38 38 WEEKS GESTATION OF PREGNANCY: Primary | ICD-10-CM

## 2019-01-22 PROCEDURE — 59025 FETAL NON-STRESS TEST: CPT | Mod: 26,,, | Performed by: OBSTETRICS & GYNECOLOGY

## 2019-01-22 PROCEDURE — 59025 FETAL NON-STRESS TEST: CPT

## 2019-01-22 PROCEDURE — 99213 PR OFFICE/OUTPT VISIT, EST, LEVL III, 20-29 MIN: ICD-10-PCS | Mod: S$PBB,25,TH, | Performed by: OBSTETRICS & GYNECOLOGY

## 2019-01-22 PROCEDURE — 99213 OFFICE O/P EST LOW 20 MIN: CPT | Mod: S$PBB,25,TH, | Performed by: OBSTETRICS & GYNECOLOGY

## 2019-01-22 PROCEDURE — 99212 OFFICE O/P EST SF 10 MIN: CPT | Mod: PBBFAC,PO | Performed by: OBSTETRICS & GYNECOLOGY

## 2019-01-22 PROCEDURE — 59025 PR FETAL 2N-STRESS TEST: ICD-10-PCS | Mod: 26,,, | Performed by: OBSTETRICS & GYNECOLOGY

## 2019-01-22 PROCEDURE — 99999 PR PBB SHADOW E&M-EST. PATIENT-LVL II: CPT | Mod: PBBFAC,,, | Performed by: OBSTETRICS & GYNECOLOGY

## 2019-01-22 PROCEDURE — 99999 PR PBB SHADOW E&M-EST. PATIENT-LVL II: ICD-10-PCS | Mod: PBBFAC,,, | Performed by: OBSTETRICS & GYNECOLOGY

## 2019-01-22 NOTE — PROGRESS NOTES
Baby actvie, fht good  cx soft, closed  vtx low  Blood and csection tubal cosent done today  Medicaid consent done dec 17  Discussed surgery thur 7:15, needs to be npo from wed night and get to LND 5am

## 2019-01-23 RX ORDER — MUPIROCIN 20 MG/G
OINTMENT TOPICAL
Status: CANCELLED | OUTPATIENT
Start: 2019-01-23

## 2019-01-23 RX ORDER — SODIUM CITRATE AND CITRIC ACID MONOHYDRATE 334; 500 MG/5ML; MG/5ML
30 SOLUTION ORAL
Status: CANCELLED | OUTPATIENT
Start: 2019-01-23

## 2019-01-23 RX ORDER — SODIUM CHLORIDE, SODIUM LACTATE, POTASSIUM CHLORIDE, CALCIUM CHLORIDE 600; 310; 30; 20 MG/100ML; MG/100ML; MG/100ML; MG/100ML
INJECTION, SOLUTION INTRAVENOUS CONTINUOUS
Status: CANCELLED | OUTPATIENT
Start: 2019-01-23

## 2019-01-24 ENCOUNTER — HOSPITAL ENCOUNTER (INPATIENT)
Facility: HOSPITAL | Age: 29
LOS: 3 days | Discharge: HOME | End: 2019-01-27
Attending: OBSTETRICS & GYNECOLOGY | Admitting: OBSTETRICS & GYNECOLOGY
Payer: MEDICAID

## 2019-01-24 ENCOUNTER — ANESTHESIA (OUTPATIENT)
Dept: OBSTETRICS AND GYNECOLOGY | Facility: HOSPITAL | Age: 29
End: 2019-01-24
Payer: MEDICAID

## 2019-01-24 ENCOUNTER — ANESTHESIA EVENT (OUTPATIENT)
Dept: OBSTETRICS AND GYNECOLOGY | Facility: HOSPITAL | Age: 29
End: 2019-01-24
Payer: MEDICAID

## 2019-01-24 DIAGNOSIS — Z3A.39 39 WEEKS GESTATION OF PREGNANCY: ICD-10-CM

## 2019-01-24 DIAGNOSIS — Z3A.38 38 WEEKS GESTATION OF PREGNANCY: ICD-10-CM

## 2019-01-24 LAB
ABO + RH BLD: NORMAL
BASOPHILS # BLD AUTO: 0.01 K/UL
BASOPHILS NFR BLD: 0.1 %
BLD GP AB SCN CELLS X3 SERPL QL: NORMAL
DIFFERENTIAL METHOD: ABNORMAL
EOSINOPHIL # BLD AUTO: 0.2 K/UL
EOSINOPHIL NFR BLD: 1.5 %
ERYTHROCYTE [DISTWIDTH] IN BLOOD BY AUTOMATED COUNT: 15.4 %
HCT VFR BLD AUTO: 37.7 %
HGB BLD-MCNC: 11.8 G/DL
LYMPHOCYTES # BLD AUTO: 2.2 K/UL
LYMPHOCYTES NFR BLD: 19.9 %
MCH RBC QN AUTO: 23.2 PG
MCHC RBC AUTO-ENTMCNC: 31.3 G/DL
MCV RBC AUTO: 74 FL
MONOCYTES # BLD AUTO: 0.7 K/UL
MONOCYTES NFR BLD: 5.9 %
NEUTROPHILS # BLD AUTO: 7.9 K/UL
NEUTROPHILS NFR BLD: 72.6 %
PLATELET # BLD AUTO: 180 K/UL
PMV BLD AUTO: 11.5 FL
RBC # BLD AUTO: 5.09 M/UL
WBC # BLD AUTO: 10.98 K/UL

## 2019-01-24 PROCEDURE — 25000003 PHARM REV CODE 250: Performed by: STUDENT IN AN ORGANIZED HEALTH CARE EDUCATION/TRAINING PROGRAM

## 2019-01-24 PROCEDURE — 85025 COMPLETE CBC W/AUTO DIFF WBC: CPT

## 2019-01-24 PROCEDURE — 59514 CESAREAN DELIVERY ONLY: CPT | Mod: GB,,, | Performed by: OBSTETRICS & GYNECOLOGY

## 2019-01-24 PROCEDURE — 63600175 PHARM REV CODE 636 W HCPCS: Performed by: STUDENT IN AN ORGANIZED HEALTH CARE EDUCATION/TRAINING PROGRAM

## 2019-01-24 PROCEDURE — 11000001 HC ACUTE MED/SURG PRIVATE ROOM

## 2019-01-24 PROCEDURE — 63600175 PHARM REV CODE 636 W HCPCS: Performed by: OBSTETRICS & GYNECOLOGY

## 2019-01-24 PROCEDURE — 36000685 HC CESAREAN SECTION LEVEL I

## 2019-01-24 PROCEDURE — 51702 INSERT TEMP BLADDER CATH: CPT

## 2019-01-24 PROCEDURE — 37000008 HC ANESTHESIA 1ST 15 MINUTES: Performed by: OBSTETRICS & GYNECOLOGY

## 2019-01-24 PROCEDURE — 25000003 PHARM REV CODE 250: Performed by: OBSTETRICS & GYNECOLOGY

## 2019-01-24 PROCEDURE — 27201121 HC TRAY,SPINAL-HYPERBARIC: Performed by: STUDENT IN AN ORGANIZED HEALTH CARE EDUCATION/TRAINING PROGRAM

## 2019-01-24 PROCEDURE — 59514 PR CESAREAN DELIVERY ONLY: ICD-10-PCS | Mod: GB,,, | Performed by: OBSTETRICS & GYNECOLOGY

## 2019-01-24 PROCEDURE — 86850 RBC ANTIBODY SCREEN: CPT

## 2019-01-24 PROCEDURE — 36415 COLL VENOUS BLD VENIPUNCTURE: CPT

## 2019-01-24 PROCEDURE — 37000009 HC ANESTHESIA EA ADD 15 MINS: Performed by: OBSTETRICS & GYNECOLOGY

## 2019-01-24 PROCEDURE — 86592 SYPHILIS TEST NON-TREP QUAL: CPT

## 2019-01-24 RX ORDER — CEFAZOLIN SODIUM 2 G/50ML
2 SOLUTION INTRAVENOUS ONCE
Status: COMPLETED | OUTPATIENT
Start: 2019-01-24 | End: 2019-01-24

## 2019-01-24 RX ORDER — DIPHENHYDRAMINE HYDROCHLORIDE 50 MG/ML
12.5 INJECTION INTRAMUSCULAR; INTRAVENOUS NIGHTLY PRN
Status: DISCONTINUED | OUTPATIENT
Start: 2019-01-24 | End: 2019-01-24

## 2019-01-24 RX ORDER — MUPIROCIN 20 MG/G
OINTMENT TOPICAL
Status: DISCONTINUED | OUTPATIENT
Start: 2019-01-24 | End: 2019-01-27 | Stop reason: HOSPADM

## 2019-01-24 RX ORDER — OXYTOCIN/RINGER'S LACTATE 20/1000 ML
41.65 PLASTIC BAG, INJECTION (ML) INTRAVENOUS CONTINUOUS
Status: ACTIVE | OUTPATIENT
Start: 2019-01-24 | End: 2019-01-24

## 2019-01-24 RX ORDER — NALBUPHINE HYDROCHLORIDE 20 MG/ML
2.5 INJECTION, SOLUTION INTRAMUSCULAR; INTRAVENOUS; SUBCUTANEOUS ONCE
Status: COMPLETED | OUTPATIENT
Start: 2019-01-24 | End: 2019-01-24

## 2019-01-24 RX ORDER — ADHESIVE BANDAGE
30 BANDAGE TOPICAL 2 TIMES DAILY PRN
Status: DISCONTINUED | OUTPATIENT
Start: 2019-01-25 | End: 2019-01-27 | Stop reason: HOSPADM

## 2019-01-24 RX ORDER — NALBUPHINE HYDROCHLORIDE 20 MG/ML
5 INJECTION, SOLUTION INTRAMUSCULAR; INTRAVENOUS; SUBCUTANEOUS ONCE
Status: DISCONTINUED | OUTPATIENT
Start: 2019-01-24 | End: 2019-01-27 | Stop reason: HOSPADM

## 2019-01-24 RX ORDER — ACETAMINOPHEN 10 MG/ML
1000 INJECTION, SOLUTION INTRAVENOUS EVERY 8 HOURS
Status: COMPLETED | OUTPATIENT
Start: 2019-01-24 | End: 2019-01-25

## 2019-01-24 RX ORDER — ONDANSETRON 8 MG/1
8 TABLET, ORALLY DISINTEGRATING ORAL EVERY 8 HOURS PRN
Status: DISCONTINUED | OUTPATIENT
Start: 2019-01-24 | End: 2019-01-24

## 2019-01-24 RX ORDER — BUPIVACAINE HYDROCHLORIDE 7.5 MG/ML
INJECTION, SOLUTION INTRASPINAL
Status: DISCONTINUED | OUTPATIENT
Start: 2019-01-24 | End: 2019-01-24

## 2019-01-24 RX ORDER — OXYCODONE AND ACETAMINOPHEN 10; 325 MG/1; MG/1
1 TABLET ORAL EVERY 4 HOURS PRN
Status: DISCONTINUED | OUTPATIENT
Start: 2019-01-24 | End: 2019-01-27 | Stop reason: HOSPADM

## 2019-01-24 RX ORDER — SIMETHICONE 80 MG
1 TABLET,CHEWABLE ORAL EVERY 6 HOURS PRN
Status: DISCONTINUED | OUTPATIENT
Start: 2019-01-24 | End: 2019-01-27 | Stop reason: HOSPADM

## 2019-01-24 RX ORDER — SODIUM CITRATE AND CITRIC ACID MONOHYDRATE 334; 500 MG/5ML; MG/5ML
30 SOLUTION ORAL
Status: DISCONTINUED | OUTPATIENT
Start: 2019-01-24 | End: 2019-01-27 | Stop reason: HOSPADM

## 2019-01-24 RX ORDER — ACETAMINOPHEN 325 MG/1
650 TABLET ORAL EVERY 6 HOURS PRN
Status: DISCONTINUED | OUTPATIENT
Start: 2019-01-24 | End: 2019-01-27 | Stop reason: HOSPADM

## 2019-01-24 RX ORDER — FENTANYL CITRATE 50 UG/ML
INJECTION, SOLUTION INTRAMUSCULAR; INTRAVENOUS
Status: DISCONTINUED | OUTPATIENT
Start: 2019-01-24 | End: 2019-01-24

## 2019-01-24 RX ORDER — MUPIROCIN 20 MG/G
1 OINTMENT TOPICAL 2 TIMES DAILY
Status: DISCONTINUED | OUTPATIENT
Start: 2019-01-24 | End: 2019-01-27 | Stop reason: HOSPADM

## 2019-01-24 RX ORDER — FAMOTIDINE 10 MG/ML
20 INJECTION INTRAVENOUS ONCE
Status: DISCONTINUED | OUTPATIENT
Start: 2019-01-24 | End: 2019-01-24

## 2019-01-24 RX ORDER — SODIUM CHLORIDE, SODIUM LACTATE, POTASSIUM CHLORIDE, CALCIUM CHLORIDE 600; 310; 30; 20 MG/100ML; MG/100ML; MG/100ML; MG/100ML
INJECTION, SOLUTION INTRAVENOUS CONTINUOUS
Status: ACTIVE | OUTPATIENT
Start: 2019-01-24 | End: 2019-01-25

## 2019-01-24 RX ORDER — KETOROLAC TROMETHAMINE 30 MG/ML
INJECTION, SOLUTION INTRAMUSCULAR; INTRAVENOUS
Status: DISCONTINUED | OUTPATIENT
Start: 2019-01-24 | End: 2019-01-24

## 2019-01-24 RX ORDER — MORPHINE SULFATE 1 MG/ML
INJECTION, SOLUTION EPIDURAL; INTRATHECAL; INTRAVENOUS
Status: DISCONTINUED | OUTPATIENT
Start: 2019-01-24 | End: 2019-01-24

## 2019-01-24 RX ORDER — OXYCODONE AND ACETAMINOPHEN 5; 325 MG/1; MG/1
1 TABLET ORAL EVERY 4 HOURS PRN
Status: DISCONTINUED | OUTPATIENT
Start: 2019-01-24 | End: 2019-01-27 | Stop reason: HOSPADM

## 2019-01-24 RX ORDER — NAPROXEN 500 MG/1
500 TABLET ORAL 2 TIMES DAILY WITH MEALS
Status: DISCONTINUED | OUTPATIENT
Start: 2019-01-24 | End: 2019-01-24

## 2019-01-24 RX ORDER — OXYCODONE HYDROCHLORIDE 5 MG/1
10 TABLET ORAL EVERY 4 HOURS PRN
Status: DISCONTINUED | OUTPATIENT
Start: 2019-01-24 | End: 2019-01-27 | Stop reason: HOSPADM

## 2019-01-24 RX ORDER — NAPROXEN 500 MG/1
500 TABLET ORAL 2 TIMES DAILY WITH MEALS
Status: DISCONTINUED | OUTPATIENT
Start: 2019-01-25 | End: 2019-01-27 | Stop reason: HOSPADM

## 2019-01-24 RX ORDER — SODIUM CHLORIDE, SODIUM LACTATE, POTASSIUM CHLORIDE, CALCIUM CHLORIDE 600; 310; 30; 20 MG/100ML; MG/100ML; MG/100ML; MG/100ML
INJECTION, SOLUTION INTRAVENOUS CONTINUOUS PRN
Status: DISCONTINUED | OUTPATIENT
Start: 2019-01-24 | End: 2019-01-24

## 2019-01-24 RX ORDER — OXYTOCIN 10 [USP'U]/ML
INJECTION, SOLUTION INTRAMUSCULAR; INTRAVENOUS
Status: DISCONTINUED | OUTPATIENT
Start: 2019-01-24 | End: 2019-01-24

## 2019-01-24 RX ORDER — ONDANSETRON 8 MG/1
8 TABLET, ORALLY DISINTEGRATING ORAL EVERY 8 HOURS PRN
Status: DISCONTINUED | OUTPATIENT
Start: 2019-01-24 | End: 2019-01-27 | Stop reason: HOSPADM

## 2019-01-24 RX ORDER — PHENYLEPHRINE HYDROCHLORIDE 10 MG/ML
INJECTION INTRAVENOUS
Status: DISCONTINUED | OUTPATIENT
Start: 2019-01-24 | End: 2019-01-24

## 2019-01-24 RX ORDER — BISACODYL 10 MG
10 SUPPOSITORY, RECTAL RECTAL ONCE AS NEEDED
Status: DISCONTINUED | OUTPATIENT
Start: 2019-01-24 | End: 2019-01-27 | Stop reason: HOSPADM

## 2019-01-24 RX ORDER — SODIUM CHLORIDE, SODIUM LACTATE, POTASSIUM CHLORIDE, CALCIUM CHLORIDE 600; 310; 30; 20 MG/100ML; MG/100ML; MG/100ML; MG/100ML
INJECTION, SOLUTION INTRAVENOUS CONTINUOUS
Status: DISCONTINUED | OUTPATIENT
Start: 2019-01-24 | End: 2019-01-27 | Stop reason: HOSPADM

## 2019-01-24 RX ORDER — AMOXICILLIN 250 MG
1 CAPSULE ORAL NIGHTLY PRN
Status: DISCONTINUED | OUTPATIENT
Start: 2019-01-24 | End: 2019-01-27 | Stop reason: HOSPADM

## 2019-01-24 RX ORDER — MORPHINE SULFATE 4 MG/ML
2 INJECTION, SOLUTION INTRAMUSCULAR; INTRAVENOUS
Status: DISPENSED | OUTPATIENT
Start: 2019-01-24 | End: 2019-01-25

## 2019-01-24 RX ORDER — ONDANSETRON HYDROCHLORIDE 2 MG/ML
INJECTION, SOLUTION INTRAMUSCULAR; INTRAVENOUS
Status: DISCONTINUED | OUTPATIENT
Start: 2019-01-24 | End: 2019-01-24

## 2019-01-24 RX ORDER — KETOROLAC TROMETHAMINE 30 MG/ML
30 INJECTION, SOLUTION INTRAMUSCULAR; INTRAVENOUS EVERY 6 HOURS
Status: COMPLETED | OUTPATIENT
Start: 2019-01-24 | End: 2019-01-25

## 2019-01-24 RX ORDER — SODIUM CITRATE AND CITRIC ACID MONOHYDRATE 334; 500 MG/5ML; MG/5ML
30 SOLUTION ORAL ONCE
Status: DISCONTINUED | OUTPATIENT
Start: 2019-01-24 | End: 2019-01-24

## 2019-01-24 RX ORDER — OXYCODONE HYDROCHLORIDE 5 MG/1
5 TABLET ORAL EVERY 4 HOURS PRN
Status: DISCONTINUED | OUTPATIENT
Start: 2019-01-24 | End: 2019-01-27 | Stop reason: HOSPADM

## 2019-01-24 RX ADMIN — SODIUM CHLORIDE, SODIUM LACTATE, POTASSIUM CHLORIDE, AND CALCIUM CHLORIDE: .6; .31; .03; .02 INJECTION, SOLUTION INTRAVENOUS at 07:01

## 2019-01-24 RX ADMIN — PHENYLEPHRINE HYDROCHLORIDE 200 MCG: 10 INJECTION INTRAVENOUS at 07:01

## 2019-01-24 RX ADMIN — SODIUM CITRATE AND CITRIC ACID MONOHYDRATE 30 ML: 500; 334 SOLUTION ORAL at 06:01

## 2019-01-24 RX ADMIN — KETOROLAC TROMETHAMINE 30 MG: 30 INJECTION, SOLUTION INTRAMUSCULAR at 07:01

## 2019-01-24 RX ADMIN — ONDANSETRON 4 MG: 2 INJECTION, SOLUTION INTRAMUSCULAR; INTRAVENOUS at 07:01

## 2019-01-24 RX ADMIN — KETOROLAC TROMETHAMINE 30 MG: 30 INJECTION, SOLUTION INTRAMUSCULAR at 02:01

## 2019-01-24 RX ADMIN — OXYTOCIN 3 UNITS: 10 INJECTION, SOLUTION INTRAMUSCULAR; INTRAVENOUS at 07:01

## 2019-01-24 RX ADMIN — CEFAZOLIN SODIUM 2 G: 2 SOLUTION INTRAVENOUS at 06:01

## 2019-01-24 RX ADMIN — NALBUPHINE HYDROCHLORIDE 2.6 MG: 20 INJECTION, SOLUTION INTRAMUSCULAR; INTRAVENOUS; SUBCUTANEOUS at 09:01

## 2019-01-24 RX ADMIN — MORPHINE SULFATE 2 MG: 4 INJECTION INTRAVENOUS at 07:01

## 2019-01-24 RX ADMIN — SODIUM CHLORIDE, SODIUM LACTATE, POTASSIUM CHLORIDE, AND CALCIUM CHLORIDE: .6; .31; .03; .02 INJECTION, SOLUTION INTRAVENOUS at 12:01

## 2019-01-24 RX ADMIN — MUPIROCIN: 20 OINTMENT TOPICAL at 06:01

## 2019-01-24 RX ADMIN — MORPHINE SULFATE 0.2 MG: 1 INJECTION, SOLUTION EPIDURAL; INTRATHECAL; INTRAVENOUS at 07:01

## 2019-01-24 RX ADMIN — PHENYLEPHRINE HYDROCHLORIDE 100 MCG: 10 INJECTION INTRAVENOUS at 07:01

## 2019-01-24 RX ADMIN — BUPIVACAINE HYDROCHLORIDE 1.6 ML: 7.5 INJECTION, SOLUTION SUBARACHNOID at 07:01

## 2019-01-24 RX ADMIN — ACETAMINOPHEN 1000 MG: 10 INJECTION, SOLUTION INTRAVENOUS at 02:01

## 2019-01-24 RX ADMIN — FENTANYL CITRATE 10 MCG: 50 INJECTION, SOLUTION INTRAMUSCULAR; INTRAVENOUS at 07:01

## 2019-01-24 RX ADMIN — OXYCODONE HYDROCHLORIDE AND ACETAMINOPHEN 1 TABLET: 10; 325 TABLET ORAL at 11:01

## 2019-01-24 RX ADMIN — SODIUM CHLORIDE, SODIUM LACTATE, POTASSIUM CHLORIDE, AND CALCIUM CHLORIDE 1000 ML: .6; .31; .03; .02 INJECTION, SOLUTION INTRAVENOUS at 06:01

## 2019-01-24 RX ADMIN — AZITHROMYCIN MONOHYDRATE 500 MG: 500 INJECTION, POWDER, LYOPHILIZED, FOR SOLUTION INTRAVENOUS at 06:01

## 2019-01-24 RX ADMIN — MUPIROCIN 1 G: 20 OINTMENT TOPICAL at 09:01

## 2019-01-24 RX ADMIN — ACETAMINOPHEN 1000 MG: 10 INJECTION, SOLUTION INTRAVENOUS at 09:01

## 2019-01-24 RX ADMIN — OXYCODONE HYDROCHLORIDE 10 MG: 5 TABLET ORAL at 02:01

## 2019-01-24 RX ADMIN — KETOROLAC TROMETHAMINE 30 MG: 30 INJECTION, SOLUTION INTRAMUSCULAR; INTRAVENOUS at 07:01

## 2019-01-24 NOTE — ANESTHESIA PROCEDURE NOTES
Spinal    Diagnosis: IUP  Patient location during procedure: OR  Staffing  Resident/CRNA: Colten Taylor MD  Preanesthetic Checklist  Completed: patient identified, site marked, surgical consent, pre-op evaluation, timeout performed, IV checked, risks and benefits discussed and monitors and equipment checked  Spinal Block  Patient position: sitting  Prep: ChloraPrep  Patient monitoring: heart rate, cardiac monitor and continuous pulse ox  Approach: midline  Location: L3-4  Injection technique: single shot  CSF Fluid: clear free-flowing CSF  Needle  Needle type: Jenny   Needle gauge: 25 G  Needle length: 3.5 in  Additional Documentation: negative aspiration for heme and no paresthesia on injection  Needle localization: anatomical landmarks  Assessment  Sensory level: T4   Dermatomal levels determined by pinch or prick  Ease of block: easy  Patient's tolerance of the procedure: comfortable throughout block and no complaints  Additional Notes  Duramorph 200 mcg added.

## 2019-01-24 NOTE — TRANSFER OF CARE
"Anesthesia Transfer of Care Note    Patient: Danya Chino    Procedure(s) Performed: Procedure(s) (LRB):   SECTION, WITH TUBAL LIGATION (N/A)    Patient location: Labor and Delivery    Anesthesia Type: spinal    Transport from OR: Transported from OR on room air with adequate spontaneous ventilation    Post pain: adequate analgesia    Post assessment: no apparent anesthetic complications    Post vital signs: stable    Level of consciousness: awake, alert and oriented    Nausea/Vomiting: no nausea/vomiting    Complications: none          Last vitals:   Visit Vitals  /82   Pulse 104   Temp 37 °C (98.6 °F) (Oral)   Resp 20   Ht 5' 3" (1.6 m)   Wt 122.5 kg (270 lb)   Breastfeeding? No   BMI 47.83 kg/m²     "

## 2019-01-24 NOTE — OP NOTE
DATE OF PROCEDURE:  2019.    SURGEON:  Michael Wiedemann, M.D.    ASSISTANT:  Magali Holm.    PROCEDURES:  Repeat low transverse .    CLINICAL SUMMARY:  Ms. Chino is a 28-year-old patient who had prenatal care   diagnosed with gestational diabetes.  She was very noncompliant with her visits   and her glucose monitoring.  She also had mentioned a possible tubal ligation   and consents were done.  However, over the last two visits, she stated she was   unsure, but would let me know.  Additionally, on the morning of surgery, the   nurse that was getting her ready, she instructed the nurse, she did not want a   tubal.  Just before rolling to the back for the scheduled section, the family   ran out and said that she now wants to tubal.  In light of the unsure situation,   I elected not to do the tubal ligation which I believe was prudent.    PROCEDURE:  Repeat low transverse .    PROCEDURE IN DETAIL:  Danya was placed under spinal anesthesia and 2 g of Ancef   were administered.  She was prepped and draped with 2 preps and a sterile Holt   was placed.  A Pfannenstiel incision through the old scar was made.  The   abdomen was opened in layers without complications.  A transverse bladder flap   followed by a transverse uterine incision was made.  Clear amniotic fluid was   encountered.  A healthy female infant was delivered followed by the placenta.    The uterus was wiped clean, inspected and had very minimal bleeding.  The uterus   was closed with #1 chromic suture interlocking and this achieved good   approximation and hemostasis.  There was no active bleeding.  We irrigated and   inspected the operative field.  Satisfied we closed using chromic suture on the   peritoneum and rectus muscle, Vicryl suture on the fascia, plain gut suture in   the subcutaneous tissue and skin staples on the skin.  Blood loss was 200 to   300.  There were no complications.  The urine was clear and all counts were    correct x2.      SHEELA/ALEC  dd: 01/24/2019 08:37:11 (CST)  td: 01/24/2019 09:31:44 (CST)  Doc ID   #[id number]  Job ID #[id number]    CC:

## 2019-01-24 NOTE — PROGRESS NOTES
Post op 1 hr  Pt in recover, looks good, vss, urine clear  abd soft  Explained that section went very well with no complications

## 2019-01-24 NOTE — BRIEF OP NOTE
[facility] mwiedemann jan24  Rpt low trans c section  Healthy female infant  3 gms ancef  Urine clear  ebl 300  No complications

## 2019-01-24 NOTE — ANESTHESIA PROCEDURE NOTES
Spinal    Diagnosis: IUP  Patient location during procedure: OR  Start time: 1/24/2019 7:12 AM  Timeout: 1/24/2019 7:10 AM  End time: 1/24/2019 7:15 AM  Staffing  Anesthesiologist: Flo Abrams MD  Resident/CRNA: Colten Taylor MD  Performed: resident/CRNA   Preanesthetic Checklist  Completed: patient identified, site marked, surgical consent, pre-op evaluation, timeout performed, IV checked, risks and benefits discussed and monitors and equipment checked  Spinal Block  Patient position: sitting  Prep: ChloraPrep  Patient monitoring: heart rate, cardiac monitor and continuous pulse ox  Approach: midline  Location: L3-4  Injection technique: single shot  CSF Fluid: clear free-flowing CSF  Needle  Needle type: Jenny   Needle gauge: 25 G  Needle length: 3.5 in  Additional Documentation: negative aspiration for heme and no paresthesia on injection  Needle localization: anatomical landmarks  Assessment  Sensory level: T4   Dermatomal levels determined by pinch or prick  Ease of block: easy  Patient's tolerance of the procedure: comfortable throughout block and no complaints  Additional Notes  Duramorph 200 mcg added.

## 2019-01-24 NOTE — ANESTHESIA PREPROCEDURE EVALUATION
01/24/2019  Danya Chino is a 28 y.o., female. Schedule for C/S.    Anesthesia Evaluation         Review of Systems  Anesthesia Hx:  No problems with previous Anesthesia History of prior surgery of interest to airway management or planning:  Denies Personal Hx of Anesthesia complications.   Social:  Non-Smoker, No Alcohol Use    Hematology/Oncology:     Oncology Normal     EENT/Dental:EENT/Dental Normal   Cardiovascular:  Cardiovascular Normal Exercise tolerance: good     Pulmonary:  Pulmonary Normal    Renal/:  Renal/ Normal     Hepatic/GI:  Hepatic/GI Normal    Musculoskeletal:  Musculoskeletal Normal    OB/GYN/PEDS:  Gestational Diabetes   Neurological:  Neurology Normal    Endocrine:  Endocrine Normal    Dermatological:  Skin Normal    Psych:  Psychiatric Normal           Physical Exam  General:  Obesity    Airway/Jaw/Neck:  Airway Findings: Mouth Opening: Normal Tongue: Normal  General Airway Assessment: Adult  Mallampati: II  TM Distance: Normal, at least 6 cm        Eyes/Ears/Nose:  EYES/EARS/NOSE FINDINGS: Normal   Dental:  Dental Findings: In tact   Chest/Lungs:  Chest/Lungs Clear    Heart/Vascular:  Heart Findings: Normal       Mental Status:  Mental Status Findings: Normal        Anesthesia Plan  Type of Anesthesia, risks & benefits discussed:  Anesthesia Type:  spinal, general  Patient's Preference:   Intra-op Monitoring Plan:   Intra-op Monitoring Plan Comments:   Post Op Pain Control Plan:   Post Op Pain Control Plan Comments:   Induction:    Beta Blocker:  Patient is not currently on a Beta-Blocker (No further documentation required).       Informed Consent: Patient understands risks and agrees with Anesthesia plan.  Questions answered. Anesthesia consent signed with patient.  ASA Score: 2  emergent   Day of Surgery Review of History & Physical:            Ready For Surgery From  Anesthesia Perspective.

## 2019-01-24 NOTE — H&P
27 y/o  , prev c section , wants rpt, prob tubal  Prenatal care complicated by poor attendence and gest dm  pmh neg  psh cholecystectomy, c secton  Sh neg  Ros ne  pe vss  Head/neck nromal  abd gravid  extr nromal  Tubal consents done  A as above  Plan, rpt secton, tubal

## 2019-01-25 PROBLEM — Z3A.39 39 WEEKS GESTATION OF PREGNANCY: Status: ACTIVE | Noted: 2019-01-25

## 2019-01-25 LAB
BASOPHILS # BLD AUTO: 0.02 K/UL
BASOPHILS NFR BLD: 0.2 %
DIFFERENTIAL METHOD: ABNORMAL
EOSINOPHIL # BLD AUTO: 0.3 K/UL
EOSINOPHIL NFR BLD: 2.4 %
ERYTHROCYTE [DISTWIDTH] IN BLOOD BY AUTOMATED COUNT: 16.5 %
HCT VFR BLD AUTO: 33.9 %
HGB BLD-MCNC: 10.5 G/DL
LYMPHOCYTES # BLD AUTO: 1.4 K/UL
LYMPHOCYTES NFR BLD: 12.3 %
MCH RBC QN AUTO: 23.3 PG
MCHC RBC AUTO-ENTMCNC: 31 G/DL
MCV RBC AUTO: 75 FL
MONOCYTES # BLD AUTO: 0.9 K/UL
MONOCYTES NFR BLD: 7.6 %
NEUTROPHILS # BLD AUTO: 8.7 K/UL
NEUTROPHILS NFR BLD: 77.5 %
PLATELET # BLD AUTO: 163 K/UL
PMV BLD AUTO: 12.7 FL
RBC # BLD AUTO: 4.5 M/UL
RPR SER QL: NORMAL
WBC # BLD AUTO: 11.18 K/UL

## 2019-01-25 PROCEDURE — 85025 COMPLETE CBC W/AUTO DIFF WBC: CPT

## 2019-01-25 PROCEDURE — 63600175 PHARM REV CODE 636 W HCPCS: Performed by: STUDENT IN AN ORGANIZED HEALTH CARE EDUCATION/TRAINING PROGRAM

## 2019-01-25 PROCEDURE — 25000003 PHARM REV CODE 250: Performed by: OBSTETRICS & GYNECOLOGY

## 2019-01-25 PROCEDURE — 36415 COLL VENOUS BLD VENIPUNCTURE: CPT

## 2019-01-25 PROCEDURE — 11000001 HC ACUTE MED/SURG PRIVATE ROOM

## 2019-01-25 PROCEDURE — 99231 PR SUBSEQUENT HOSPITAL CARE,LEVL I: ICD-10-PCS | Mod: ,,, | Performed by: OBSTETRICS & GYNECOLOGY

## 2019-01-25 PROCEDURE — 99231 SBSQ HOSP IP/OBS SF/LOW 25: CPT | Mod: ,,, | Performed by: OBSTETRICS & GYNECOLOGY

## 2019-01-25 RX ORDER — HYDROMORPHONE HYDROCHLORIDE 2 MG/ML
2 INJECTION, SOLUTION INTRAMUSCULAR; INTRAVENOUS; SUBCUTANEOUS ONCE
Status: COMPLETED | OUTPATIENT
Start: 2019-01-25 | End: 2019-01-26

## 2019-01-25 RX ADMIN — OXYCODONE HYDROCHLORIDE AND ACETAMINOPHEN 1 TABLET: 10; 325 TABLET ORAL at 08:01

## 2019-01-25 RX ADMIN — MUPIROCIN 1 G: 20 OINTMENT TOPICAL at 08:01

## 2019-01-25 RX ADMIN — MUPIROCIN 1 G: 20 OINTMENT TOPICAL at 07:01

## 2019-01-25 RX ADMIN — ACETAMINOPHEN 1000 MG: 10 INJECTION, SOLUTION INTRAVENOUS at 05:01

## 2019-01-25 RX ADMIN — NAPROXEN 500 MG: 500 TABLET ORAL at 07:01

## 2019-01-25 RX ADMIN — KETOROLAC TROMETHAMINE 30 MG: 30 INJECTION, SOLUTION INTRAMUSCULAR at 01:01

## 2019-01-25 RX ADMIN — NAPROXEN 500 MG: 500 TABLET ORAL at 04:01

## 2019-01-25 RX ADMIN — OXYCODONE HYDROCHLORIDE AND ACETAMINOPHEN 1 TABLET: 10; 325 TABLET ORAL at 04:01

## 2019-01-25 RX ADMIN — OXYCODONE HYDROCHLORIDE AND ACETAMINOPHEN 1 TABLET: 10; 325 TABLET ORAL at 03:01

## 2019-01-25 NOTE — ANESTHESIA POSTPROCEDURE EVALUATION
"Anesthesia Post Evaluation    Patient: Danya Chino    Procedure(s) Performed: Procedure(s) (LRB):   SECTION, WITH TUBAL LIGATION (N/A)    Final Anesthesia Type: spinal  Patient location during evaluation: labor & delivery  Patient participation: Yes- Able to Participate  Level of consciousness: awake and alert  Post-procedure vital signs: reviewed and stable  Pain management: adequate  Airway patency: patent  PONV status at discharge: No PONV  Anesthetic complications: no      Cardiovascular status: blood pressure returned to baseline  Respiratory status: unassisted  Hydration status: euvolemic  Follow-up needed         Visit Vitals  BP (!) 112/55 (BP Location: Right arm, Patient Position: Lying)   Pulse 88   Temp 36.4 °C (97.5 °F) (Oral)   Resp 18   Ht 5' 3" (1.6 m)   Wt 122.5 kg (270 lb)   SpO2 99%   Breastfeeding? Unknown   BMI 47.83 kg/m²       Pain/Bebe Score: Pain Rating Prior to Med Admin: 8 (2019  8:55 AM)  Pain Rating Post Med Admin: 0 (2019 10:00 AM)    No residual paresthesias or weakness, no incontinence, no headaches. Ambulating without difficulty.     "

## 2019-01-25 NOTE — PLAN OF CARE
Dr. Wiedemann on unit.  Verbal order received to discontinue Saline Lock.  Give Dilaudid 2 mg Intramuscular for 1 dose, as needed, tonight.

## 2019-01-25 NOTE — PLAN OF CARE
0750am=  Eyes closed, in bed, resp even and unlabored.  Arouses easily.  Reports pain and medicated as ordered.  Encouraged pt to get OOB and ambulate today, and informed of importance of ambulating.  Pt verbalized understanding, but refusing at present.  Mother asleep at BS.  Voices no further complaints at present.  Will continue to monitor.  Safety maintained with call light in reach.

## 2019-01-25 NOTE — PROGRESS NOTES
Vss, pt voiding well  abd soft, inc dry  extr normal  Discussed activity, care, dc prob Sunday  Rx for perc and naproxen given to pharmacy here  Progressive care

## 2019-01-25 NOTE — PROGRESS NOTES
Post op day 1 from repeat low heard c section  Healthy female  Rn reports pt had alex  Urine output was good/clear  Vss;  Pt sound asleep  Will return for exam  Lab stable  A stable  Plan, progressive care    I returned a second time  Same situation, pt sleeping soundly,  Will return later

## 2019-01-25 NOTE — PLAN OF CARE
Problem: Adult Inpatient Plan of Care  Goal: Patient-Specific Goal (Individualization)  Plan of care reviewed with pt.  VSS. Pt voiding and passing flatus without difficulty.  Ambulating to bathroom with assist.  Encouraged frequently to ambulate and verbalized understanding,   Tolerating regular diet.  Reports pain relieved with ordered pain meds administered.   Questions encouraged and answered.   Encouraged to feed infant 8 or more times in 24 hour period and on demand feedings.  Verbalized understanding.  Paced bottle feedings encouraged; on cue feedings promoted.   Mother will continue to observe for on cue feedings for infant.

## 2019-01-25 NOTE — PLAN OF CARE
Ambulated to bathroom with assist.  Pt showering and tolerating well.  Offered assistance, but pt declined.  Nurse outside pt's bathroom door to observe.  No distress noted.

## 2019-01-26 PROCEDURE — 11000001 HC ACUTE MED/SURG PRIVATE ROOM

## 2019-01-26 PROCEDURE — 25000003 PHARM REV CODE 250: Performed by: OBSTETRICS & GYNECOLOGY

## 2019-01-26 PROCEDURE — 99232 SBSQ HOSP IP/OBS MODERATE 35: CPT | Mod: ,,, | Performed by: OBSTETRICS & GYNECOLOGY

## 2019-01-26 PROCEDURE — 63600175 PHARM REV CODE 636 W HCPCS: Performed by: OBSTETRICS & GYNECOLOGY

## 2019-01-26 PROCEDURE — 99232 PR SUBSEQUENT HOSPITAL CARE,LEVL II: ICD-10-PCS | Mod: ,,, | Performed by: OBSTETRICS & GYNECOLOGY

## 2019-01-26 RX ADMIN — MUPIROCIN 1 G: 20 OINTMENT TOPICAL at 08:01

## 2019-01-26 RX ADMIN — NAPROXEN 500 MG: 500 TABLET ORAL at 07:01

## 2019-01-26 RX ADMIN — OXYCODONE HYDROCHLORIDE AND ACETAMINOPHEN 1 TABLET: 10; 325 TABLET ORAL at 10:01

## 2019-01-26 RX ADMIN — MUPIROCIN 1 G: 20 OINTMENT TOPICAL at 09:01

## 2019-01-26 RX ADMIN — OXYCODONE HYDROCHLORIDE AND ACETAMINOPHEN 1 TABLET: 10; 325 TABLET ORAL at 05:01

## 2019-01-26 RX ADMIN — HYDROMORPHONE HYDROCHLORIDE 2 MG: 2 INJECTION, SOLUTION INTRAMUSCULAR; INTRAVENOUS; SUBCUTANEOUS at 01:01

## 2019-01-26 RX ADMIN — NAPROXEN 500 MG: 500 TABLET ORAL at 05:01

## 2019-01-26 RX ADMIN — OXYCODONE HYDROCHLORIDE AND ACETAMINOPHEN 1 TABLET: 10; 325 TABLET ORAL at 07:01

## 2019-01-26 RX ADMIN — SENNOSIDES AND DOCUSATE SODIUM 1 TABLET: 8.6; 5 TABLET ORAL at 10:01

## 2019-01-26 NOTE — PLAN OF CARE
1215- mother states patient would like to walk around the halls. Infant brought to the nurses station while patient is ambulating in halls

## 2019-01-26 NOTE — PLAN OF CARE
1720Refused to state her pain number.  State has median amount of pain but hurts way more when ambulating. Pt states she would like any pain medication that is due.  Asked me to leave out the room. Refused assessment.  The patients mother asked if I could come back in 20 minutes.

## 2019-01-26 NOTE — PROGRESS NOTES
POD#2  delivery + BTL     Vss, pt voiding well  abd soft, inc dry  extr normal  Discussed activity, care,   Progressive care   Rx sent to pharmacy   Anticipate D/C tmorrow       Ed Obregon M.D.   OB/GYN    2019

## 2019-01-27 VITALS
SYSTOLIC BLOOD PRESSURE: 137 MMHG | OXYGEN SATURATION: 98 % | DIASTOLIC BLOOD PRESSURE: 62 MMHG | HEIGHT: 63 IN | RESPIRATION RATE: 18 BRPM | TEMPERATURE: 98 F | BODY MASS INDEX: 47.84 KG/M2 | HEART RATE: 75 BPM | WEIGHT: 270 LBS

## 2019-01-27 PROCEDURE — 25000003 PHARM REV CODE 250: Performed by: OBSTETRICS & GYNECOLOGY

## 2019-01-27 PROCEDURE — 99238 PR HOSPITAL DISCHARGE DAY,<30 MIN: ICD-10-PCS | Mod: ,,, | Performed by: OBSTETRICS & GYNECOLOGY

## 2019-01-27 PROCEDURE — 99238 HOSP IP/OBS DSCHRG MGMT 30/<: CPT | Mod: ,,, | Performed by: OBSTETRICS & GYNECOLOGY

## 2019-01-27 RX ADMIN — OXYCODONE HYDROCHLORIDE AND ACETAMINOPHEN 1 TABLET: 10; 325 TABLET ORAL at 06:01

## 2019-01-27 RX ADMIN — MUPIROCIN 1 G: 20 OINTMENT TOPICAL at 08:01

## 2019-01-27 RX ADMIN — NAPROXEN 500 MG: 500 TABLET ORAL at 08:01

## 2019-01-27 NOTE — PLAN OF CARE
Problem: Adult Inpatient Plan of Care  Goal: Plan of Care Review  Outcome: Ongoing (interventions implemented as appropriate)  Pt tolerating regular diet, pain managed with PO pain medication, no acute distress or discomfort noted. Will continue to monitor.

## 2019-01-27 NOTE — DISCHARGE SUMMARY
Delivery Discharge Summary  Obstetrics      Primary OB Clinician: Ed Obregon    Admission date: 2019  Discharge date: 2019    Admit Dx:   Patient Active Problem List   Diagnosis    Encounter for triage in pregnant patient    Vaginal bleeding in pregnancy    Gestational diabetes mellitus (GDM) in third trimester    38 weeks gestation of pregnancy    39 weeks gestation of pregnancy     Discharge Dx:   Patient Active Problem List   Diagnosis    Encounter for triage in pregnant patient    Vaginal bleeding in pregnancy    Gestational diabetes mellitus (GDM) in third trimester    38 weeks gestation of pregnancy    39 weeks gestation of pregnancy       Procedure: , due to .    No results for input(s): WBC, RBC, HGB, HCT, PLT, MCV, MCH, MCHC in the last 24 hours.    Hospital Course:  Pt is a 28 y.o. now , POD # 3 was admitted on 2019 repeat C/S   . On initial assessment, vital signs were stable and physical exam was Normal. Infant was in cephalic presentation. Patient was subsequently admitted to labor and delivery unit with signed consents.  Patient delivered a single viable  female. Please see delivery note for further details. Pt was in stable condition post delivery and was transferred to the Mother-Baby Unit. Her postpartum course was uncomplicated. On discharge day, patient's pain is well  controlled with oral pain medications. Pt is tolerating ambulation without SOB or CP, and PO diet without N/V. Reports lochia is minimal in degree. Denies any HA, vision changes, F/C, LE swelling. Denies any breast pain/soreness.  Pt in stable condition and ready for discharge, instructed to continue pain medications  and to follow up in the OB clinic in ! Week       Delivery:    Episiotomy: None   Lacerations: None   Repair suture: None   Repair # of packets:     Blood loss (ml): 0     Birth information:  YOB: 2019   Time of birth: 7:28 AM   Sex: female    Delivery type: , Low Transverse   Gestational Age: 39w0d    Delivery Clinician:      Other providers:       Additional  information:  Forceps:    Vacuum:    Breech:    Observed anomalies      Living?:           APGARS  One minute Five minutes Ten minutes   Skin color:         Heart rate:         Grimace:         Muscle tone:         Breathing:         Totals: 9  9        Placenta: Delivered:       appearance      Patient Instructions:   Current Discharge Medication List      START taking these medications    Details   naproxen (NAPROSYN) 500 MG tablet Take 1 tablet (500 mg total) by mouth every 8 (eight) hours as needed for cramps /pain. take with food  Qty: 20 tablet, Refills: 1      oxyCODONE-acetaminophen (PERCOCET) 5-325 mg per tablet take 1 tablet by mouth  every 4 to 6 hours as needed for pain  Qty: 15 tablet, Refills: 0         CONTINUE these medications which have NOT CHANGED    Details   ONETOUCH DELICA LANCETS 33 gauge Misc TEST BLOOD SUGAR QID  Refills: 1      ONETOUCH VERIO Strp              Patient is Discharged  home today   General recommendations and alarm signs are given  Pelvic rest   Follow up 1w  Rx given to pt   All questions answered       Ed Obregon M.D.   OB/GYN  2019

## 2019-01-27 NOTE — NURSING
"Pt given all discharge instructions including prescriptions from MD. Pt also received mother/baby care guide booklet during hospital stay. Reviewed at discharge. States she feels comfortable and ready for discharge to home. Pt keeps eyes closed during entire discharge instructions and doesn't seem interested, yet verbalized understanding and recalls information given.  S.O. Asleep on sofa.  Pt has been sleeping all morning.  When asked if she fed infant since 0630 she repsonds "Yes its OK".  "

## 2019-01-27 NOTE — DISCHARGE INSTRUCTIONS
"Patient Discharge Instructions for Postpartum Women    Resume Regular Diet  Increase activity gradually, no heavy lifting  Shower  No tampons, douching or sexual intercourse.  Discuss birth control options with your physician.  Wear a support bra  Return to work/school when you've been cleared by a physician    Call your physician if     *Fever of 100.4 or higher  *Persistent nausea/ vomiting  *Incisional drainage  *Heavy vaginal bleeding or large clots (Heavy bleeding is soaking 1 pad in an hour)  *Swelling and pain in arms or legs  *Severe headaches, blurred vision or fainting  *Shortness of breath  *Frequency and burning with urination  *Signs of postpartum depression, discuss these signs with your physician    Call lactation services for questions regarding feeding, nipple and breast care, and general questions about lactation.  They can be reached at [phone]         Understanding Postpartum Depression    You've just had a baby.  You know you should be excited and happy.  But instead you find yourself crying for no reason.  You may have trouble coping with your daily tasks.  You feel sad, tired, and hopeless most of the time.  You may even feel ashamed or guilty.  But what you're going through is not your fault and you can feel better.  Talk to your doctor.  He or she can help.    Depression After Childbirth    You may be weepy and tired right after giving birth.  These feelings are normal.  They're sometimes called the "baby blues."  These blues go away 2-3 weeks.  However, postpartum (meaning "after birth") depression lasts much longer and is more sever than the "baby blues."  It can make you feel sad and hopeless.  You may also fear that your baby will be harmed and worry about being a bad mother.      What is Depression?    Depression is a mood disorder that affects the way you think and feel.  The most common symptom is a feeling of deep sadness.  You may also feel as if you just can't cope with life.  "   Other symptoms include:      * Gaining or loosing weight  * Sleeping too much or too little  * Feeling tired all the time  * Feeling restless  * Fears of harming your baby   * Lack of interest in your baby  * Feeling worthless or guilty  * No longer finding pleasure in things you used to  * Having trouble thinking clearly or making decisions  * Thoughts of hurting yourself or your baby    What Causes Postpartum Depression    The exact causes of postpartum depression isn't known.  It may be due to changes in your hormones during and after childbirth.  You may also be tired from caring for your baby and adjusting to being a mother.  All these factors may make you feel depressed.  In some cases, your genes may also play a role.    Depression Can Be Treated    The good news is that there are many ways to treat postpartum depression.  Talking to your doctor is the first step toward feeling better.    Resources:    * National [place] of Mental Health  -- 514.935.8618    www.nimh.nih.gov    * National [place] on Mental Illness --299.374.5197    Www.[place].org    * Mental Health [place] -- 771.581.2737     Www.[facility].org    * National Suicide Hotline --216.788.8198 (800-SUICIDE)    3502-1595 [employer]  All rights reserved.  This information is not intended as a substitute for professional medical care.  Always follow up with your healthcare professional's instructions.

## 2019-01-27 NOTE — PROGRESS NOTES
POD#3  delivery + BTL     Vss, pt voiding well  abd soft, inc dry  extr normal  Discussed activity, care,   Progressive care   Rx given to patient by  OB  Patient is Discharged  home today   General recommendations and alarm signs are given  Pelvic rest   Follow up 1  weeks     All questions answered     Ed Obregon M.D.   OB/GYN            Ed Obregon M.D.   OB/GYN    2019

## 2019-01-28 ENCOUNTER — TELEPHONE (OUTPATIENT)
Dept: OBSTETRICS AND GYNECOLOGY | Facility: CLINIC | Age: 29
End: 2019-01-28

## 2019-01-29 ENCOUNTER — TELEPHONE (OUTPATIENT)
Dept: OBSTETRICS AND GYNECOLOGY | Facility: CLINIC | Age: 29
End: 2019-01-29

## 2019-01-31 ENCOUNTER — OFFICE VISIT (OUTPATIENT)
Dept: OBSTETRICS AND GYNECOLOGY | Facility: CLINIC | Age: 29
End: 2019-01-31
Payer: MEDICAID

## 2019-01-31 VITALS
SYSTOLIC BLOOD PRESSURE: 118 MMHG | BODY MASS INDEX: 45.27 KG/M2 | DIASTOLIC BLOOD PRESSURE: 64 MMHG | WEIGHT: 255.5 LBS | HEIGHT: 63 IN

## 2019-01-31 DIAGNOSIS — Z98.890 POST-OPERATIVE STATE: Primary | ICD-10-CM

## 2019-01-31 PROCEDURE — 99999 PR PBB SHADOW E&M-EST. PATIENT-LVL III: ICD-10-PCS | Mod: PBBFAC,,, | Performed by: OBSTETRICS & GYNECOLOGY

## 2019-01-31 PROCEDURE — 59430 PR CARE AFTER DELIVERY ONLY: ICD-10-PCS | Mod: S$PBB,,, | Performed by: OBSTETRICS & GYNECOLOGY

## 2019-01-31 PROCEDURE — 99999 PR PBB SHADOW E&M-EST. PATIENT-LVL III: CPT | Mod: PBBFAC,,, | Performed by: OBSTETRICS & GYNECOLOGY

## 2019-01-31 PROCEDURE — 99213 OFFICE O/P EST LOW 20 MIN: CPT | Mod: PBBFAC,PO | Performed by: OBSTETRICS & GYNECOLOGY

## 2019-01-31 RX ORDER — TRIAMTERENE AND HYDROCHLOROTHIAZIDE 37.5; 25 MG/1; MG/1
1 CAPSULE ORAL DAILY
Qty: 15 CAPSULE | Refills: 0 | Status: SHIPPED | OUTPATIENT
Start: 2019-01-31 | End: 2019-07-19

## 2019-01-31 NOTE — PROGRESS NOTES
"28 y.o.   OB History      Para Term  AB Living    3 3 3     3    SAB TAB Ectopic Multiple Live Births          0 3        Comlaining of: pt here for pp visit  Discomfort better  Gi and gu good  Baby good      ROS:  GENERAL: No fever, chills, fatigability or weight loss.  SKIN: No rashes, itching or changes in color or texture of skin.  HEAD: No headaches or recent head trauma.  EYES: Visual acuity fine. No photophobia, ocular pain or diplopia.  EARS: Denies ear pain, discharge or vertigo.  NOSE: No loss of smell, no epistaxis or postnasal drip.  MOUTH & THROAT: No hoarseness or change in voice. No excessive gum bleeding.  NODES: Denies swollen glands.  CHEST: Denies DALY, cyanosis, wheezing, cough and sputum production.  CARDIOVASCULAR: Denies chest pain, PND, orthopnea or reduced exercise tolerance.  ABDOMEN: Appetite fine. No weight loss. Denies diarrhea, abdominal pain, hematemesis or blood in stool.  URINARY: No flank pain, dysuria or hematuria.  PERIPHERAL VASCULAR: No claudication or cyanosis.  MUSCULOSKELETAL: No joint stiffness or swelling. Denies back pain.  NEUROLOGIC: No history of seizures, paralysis, alteration of gait or coordination      PE: /64   Ht 5' 3" (1.6 m)   Wt 115.9 kg (255 lb 8.2 oz)   Breastfeeding? No   BMI 45.26 kg/m²    abd soft  Bandage/staples removed  Healing well  extr nromal  Sl edema    Discussed tubal, pt wants  Discussed prog care/activity    A pp visit  Doing well  Plan, fu 3-4 schedule tubal  Pt req fluid pill, ordered        "

## 2019-02-01 ENCOUNTER — TELEPHONE (OUTPATIENT)
Dept: OBSTETRICS AND GYNECOLOGY | Facility: CLINIC | Age: 29
End: 2019-02-01

## 2019-02-01 NOTE — TELEPHONE ENCOUNTER
Schedule her for lap tubal ligation end march  She is coming to see me later this month  Already has medicaid consents, yay

## 2019-02-11 ENCOUNTER — TELEPHONE (OUTPATIENT)
Dept: ADMINISTRATIVE | Facility: OTHER | Age: 29
End: 2019-02-11

## 2019-07-19 ENCOUNTER — HOSPITAL ENCOUNTER (EMERGENCY)
Facility: HOSPITAL | Age: 29
Discharge: HOME | End: 2019-07-19
Attending: EMERGENCY MEDICINE
Payer: MEDICAID

## 2019-07-19 ENCOUNTER — TELEPHONE (OUTPATIENT)
Dept: OBSTETRICS AND GYNECOLOGY | Facility: CLINIC | Age: 29
End: 2019-07-19

## 2019-07-19 ENCOUNTER — OFFICE VISIT (OUTPATIENT)
Dept: OBSTETRICS AND GYNECOLOGY | Facility: CLINIC | Age: 29
End: 2019-07-19
Payer: MEDICAID

## 2019-07-19 VITALS
RESPIRATION RATE: 15 BRPM | BODY MASS INDEX: 42.52 KG/M2 | HEIGHT: 63 IN | DIASTOLIC BLOOD PRESSURE: 59 MMHG | TEMPERATURE: 98 F | OXYGEN SATURATION: 99 % | WEIGHT: 240 LBS | HEART RATE: 70 BPM | SYSTOLIC BLOOD PRESSURE: 114 MMHG

## 2019-07-19 VITALS — BODY MASS INDEX: 42.96 KG/M2 | SYSTOLIC BLOOD PRESSURE: 128 MMHG | DIASTOLIC BLOOD PRESSURE: 74 MMHG | WEIGHT: 242.5 LBS

## 2019-07-19 DIAGNOSIS — N92.6 IRREGULAR BLEEDING: Primary | ICD-10-CM

## 2019-07-19 DIAGNOSIS — N93.9 VAGINAL BLEEDING: Primary | ICD-10-CM

## 2019-07-19 DIAGNOSIS — M54.42 ACUTE BILATERAL LOW BACK PAIN WITH BILATERAL SCIATICA: ICD-10-CM

## 2019-07-19 DIAGNOSIS — M54.41 ACUTE BILATERAL LOW BACK PAIN WITH BILATERAL SCIATICA: ICD-10-CM

## 2019-07-19 PROBLEM — Z3A.39 39 WEEKS GESTATION OF PREGNANCY: Status: RESOLVED | Noted: 2019-01-25 | Resolved: 2019-07-19

## 2019-07-19 PROBLEM — O46.90 VAGINAL BLEEDING IN PREGNANCY: Status: RESOLVED | Noted: 2018-12-03 | Resolved: 2019-07-19

## 2019-07-19 PROBLEM — Z3A.38 38 WEEKS GESTATION OF PREGNANCY: Status: RESOLVED | Noted: 2019-01-24 | Resolved: 2019-07-19

## 2019-07-19 PROBLEM — Z36.89 ENCOUNTER FOR TRIAGE IN PREGNANT PATIENT: Status: RESOLVED | Noted: 2018-11-05 | Resolved: 2019-07-19

## 2019-07-19 LAB
ALBUMIN SERPL BCP-MCNC: 3.8 G/DL (ref 3.5–5.2)
ALP SERPL-CCNC: 72 U/L (ref 55–135)
ALT SERPL W/O P-5'-P-CCNC: 75 U/L (ref 10–44)
ANION GAP SERPL CALC-SCNC: 10 MMOL/L (ref 8–16)
AST SERPL-CCNC: 41 U/L (ref 10–40)
B-HCG UR QL: NEGATIVE
BACTERIA #/AREA URNS HPF: ABNORMAL /HPF
BASOPHILS # BLD AUTO: 0.02 K/UL (ref 0–0.2)
BASOPHILS NFR BLD: 0.2 % (ref 0–1.9)
BILIRUB SERPL-MCNC: 0.4 MG/DL (ref 0.1–1)
BILIRUB UR QL STRIP: ABNORMAL
BUN SERPL-MCNC: 6 MG/DL (ref 6–20)
CALCIUM SERPL-MCNC: 9.1 MG/DL (ref 8.7–10.5)
CHLORIDE SERPL-SCNC: 108 MMOL/L (ref 95–110)
CLARITY UR: ABNORMAL
CO2 SERPL-SCNC: 24 MMOL/L (ref 23–29)
COLOR UR: ABNORMAL
CREAT SERPL-MCNC: 0.8 MG/DL (ref 0.5–1.4)
CTP QC/QA: YES
DIFFERENTIAL METHOD: ABNORMAL
EOSINOPHIL # BLD AUTO: 0.3 K/UL (ref 0–0.5)
EOSINOPHIL NFR BLD: 3 % (ref 0–8)
ERYTHROCYTE [DISTWIDTH] IN BLOOD BY AUTOMATED COUNT: 14.7 % (ref 11.5–14.5)
EST. GFR  (AFRICAN AMERICAN): >60 ML/MIN/1.73 M^2
EST. GFR  (NON AFRICAN AMERICAN): >60 ML/MIN/1.73 M^2
GLUCOSE SERPL-MCNC: 86 MG/DL (ref 70–110)
GLUCOSE UR QL STRIP: NEGATIVE
HCT VFR BLD AUTO: 39.3 % (ref 37–48.5)
HGB BLD-MCNC: 12.6 G/DL (ref 12–16)
HGB UR QL STRIP: ABNORMAL
HYALINE CASTS #/AREA URNS LPF: 0 /LPF
KETONES UR QL STRIP: NEGATIVE
LEUKOCYTE ESTERASE UR QL STRIP: NEGATIVE
LYMPHOCYTES # BLD AUTO: 2.5 K/UL (ref 1–4.8)
LYMPHOCYTES NFR BLD: 28.2 % (ref 18–48)
MCH RBC QN AUTO: 24.3 PG (ref 27–31)
MCHC RBC AUTO-ENTMCNC: 32.1 G/DL (ref 32–36)
MCV RBC AUTO: 76 FL (ref 82–98)
MICROSCOPIC COMMENT: ABNORMAL
MONOCYTES # BLD AUTO: 0.5 K/UL (ref 0.3–1)
MONOCYTES NFR BLD: 5.8 % (ref 4–15)
NEUTROPHILS # BLD AUTO: 5.6 K/UL (ref 1.8–7.7)
NEUTROPHILS NFR BLD: 62.8 % (ref 38–73)
NITRITE UR QL STRIP: NEGATIVE
PH UR STRIP: 6 [PH] (ref 5–8)
PLATELET # BLD AUTO: 220 K/UL (ref 150–350)
PMV BLD AUTO: 11.7 FL (ref 9.2–12.9)
POTASSIUM SERPL-SCNC: 3.7 MMOL/L (ref 3.5–5.1)
PROT SERPL-MCNC: 7.1 G/DL (ref 6–8.4)
PROT UR QL STRIP: ABNORMAL
RBC # BLD AUTO: 5.19 M/UL (ref 4–5.4)
RBC #/AREA URNS HPF: >100 /HPF (ref 0–4)
SODIUM SERPL-SCNC: 142 MMOL/L (ref 136–145)
SP GR UR STRIP: >=1.03 (ref 1–1.03)
URN SPEC COLLECT METH UR: ABNORMAL
UROBILINOGEN UR STRIP-ACNC: NEGATIVE EU/DL
WBC # BLD AUTO: 8.9 K/UL (ref 3.9–12.7)
WBC #/AREA URNS HPF: 2 /HPF (ref 0–5)

## 2019-07-19 PROCEDURE — 99999 PR PBB SHADOW E&M-EST. PATIENT-LVL III: ICD-10-PCS | Mod: PBBFAC,,, | Performed by: OBSTETRICS & GYNECOLOGY

## 2019-07-19 PROCEDURE — 25000003 PHARM REV CODE 250: Performed by: EMERGENCY MEDICINE

## 2019-07-19 PROCEDURE — 99213 PR OFFICE/OUTPT VISIT, EST, LEVL III, 20-29 MIN: ICD-10-PCS | Mod: S$PBB,25,, | Performed by: OBSTETRICS & GYNECOLOGY

## 2019-07-19 PROCEDURE — 88305 TISSUE EXAM BY PATHOLOGIST: CPT | Performed by: PATHOLOGY

## 2019-07-19 PROCEDURE — 81000 URINALYSIS NONAUTO W/SCOPE: CPT

## 2019-07-19 PROCEDURE — 58100 BIOPSY OF UTERUS LINING: CPT

## 2019-07-19 PROCEDURE — 87491 CHLMYD TRACH DNA AMP PROBE: CPT

## 2019-07-19 PROCEDURE — 88305 TISSUE SPECIMEN TO PATHOLOGY, EMERGENCY DEPARTMENT: ICD-10-PCS | Mod: 26,,, | Performed by: PATHOLOGY

## 2019-07-19 PROCEDURE — 99213 OFFICE O/P EST LOW 20 MIN: CPT | Mod: PBBFAC,PO,25 | Performed by: OBSTETRICS & GYNECOLOGY

## 2019-07-19 PROCEDURE — 88305 TISSUE EXAM BY PATHOLOGIST: CPT | Mod: 26,,, | Performed by: PATHOLOGY

## 2019-07-19 PROCEDURE — 99999 PR PBB SHADOW E&M-EST. PATIENT-LVL III: CPT | Mod: PBBFAC,,, | Performed by: OBSTETRICS & GYNECOLOGY

## 2019-07-19 PROCEDURE — 99284 EMERGENCY DEPT VISIT MOD MDM: CPT | Mod: 25,27

## 2019-07-19 PROCEDURE — 81025 URINE PREGNANCY TEST: CPT | Performed by: EMERGENCY MEDICINE

## 2019-07-19 PROCEDURE — 85025 COMPLETE CBC W/AUTO DIFF WBC: CPT

## 2019-07-19 PROCEDURE — 96374 THER/PROPH/DIAG INJ IV PUSH: CPT

## 2019-07-19 PROCEDURE — 96361 HYDRATE IV INFUSION ADD-ON: CPT | Mod: 59

## 2019-07-19 PROCEDURE — 58100 PR BIOPSY OF UTERUS LINING: ICD-10-PCS | Mod: ,,, | Performed by: OBSTETRICS & GYNECOLOGY

## 2019-07-19 PROCEDURE — 63600175 PHARM REV CODE 636 W HCPCS: Performed by: EMERGENCY MEDICINE

## 2019-07-19 PROCEDURE — 80053 COMPREHEN METABOLIC PANEL: CPT

## 2019-07-19 PROCEDURE — 99213 OFFICE O/P EST LOW 20 MIN: CPT | Mod: S$PBB,25,, | Performed by: OBSTETRICS & GYNECOLOGY

## 2019-07-19 PROCEDURE — 58100 BIOPSY OF UTERUS LINING: CPT | Mod: ,,, | Performed by: OBSTETRICS & GYNECOLOGY

## 2019-07-19 RX ORDER — CYCLOBENZAPRINE HCL 10 MG
10 TABLET ORAL EVERY 8 HOURS PRN
Qty: 14 TABLET | Refills: 0 | Status: SHIPPED | OUTPATIENT
Start: 2019-07-19 | End: 2019-07-24

## 2019-07-19 RX ORDER — MEDROXYPROGESTERONE ACETATE 10 MG/1
TABLET ORAL
Qty: 84 TABLET | Refills: 0 | Status: SHIPPED | OUTPATIENT
Start: 2019-07-19

## 2019-07-19 RX ORDER — KETOROLAC TROMETHAMINE 30 MG/ML
15 INJECTION, SOLUTION INTRAMUSCULAR; INTRAVENOUS
Status: DISCONTINUED | OUTPATIENT
Start: 2019-07-19 | End: 2019-07-19 | Stop reason: HOSPADM

## 2019-07-19 RX ORDER — PROCHLORPERAZINE EDISYLATE 5 MG/ML
10 INJECTION INTRAMUSCULAR; INTRAVENOUS
Status: COMPLETED | OUTPATIENT
Start: 2019-07-19 | End: 2019-07-19

## 2019-07-19 RX ADMIN — SODIUM CHLORIDE 1000 ML: 0.9 INJECTION, SOLUTION INTRAVENOUS at 04:07

## 2019-07-19 RX ADMIN — PROCHLORPERAZINE EDISYLATE 10 MG: 5 INJECTION INTRAMUSCULAR; INTRAVENOUS at 06:07

## 2019-07-19 NOTE — PROGRESS NOTES
GYNECOLOGY OFFICE NOTE    Reason for visit: irregular bleeding    HPI: Pt is a 28 y.o.  female  who presents for evaluation of irregular bleeding. Continuous bleeding- every day since April. Heavy flow- changing pad every 2 hrs. +midline back pain that radiates to legs. +neck pain. States pain started after epidural. S/p RLTCD on 2019 with Dr. Wiedemann. Denies fever/chills. States cycles prior to pregnancy were normal. Cycle: menarche- 11, Interval-  Q month, Duration- 6-7 days, Flow- normal, denies dysmenorrhea. She is sexually active.  She uses no method for contraception- desired BTL. Last pap: unsure, denies hx of abnormal. Pt states she waited this long because she normally puts her children first and only comes to the doctor when she is pregnant. Now having a lot of fatigue and shortness of breath she initially attributed to being overweight. I explained possible differential for bleeding. S/p delivery- retained products lower on the list without fever/pelvic pain. Discussed need for labs to check for anemia and imaging of uterus. Pt visibly upset that she couldn't get ultrasound right now stating she just wants to know what's wrong with her right now and doesn't want to keep coming back. I recommend she go to the ED for CBC and imaging as I am not able to coordinate outpatient labs/imaging with results in <2-3hrs. Discussed possibility of needing blood if symptomatic from anemia. Explained that neck, back, and leg pain wouldn't cause her irregular bleeding. I offered medication to stop the acute bleeding which estrogen, estrogen and progesterone, and progesterone only. Pt states she doesn't like E&P but not too enthused with taking a provera taper either. Pt began to cry and stated I had an attitude with her since I came in to discuss her problems. She yelled and asked me to get out so she could just get dressed. I offered her an apology for feelings and asked if she would like to see  another provider today as this is a problem that immediate attention today. Pt now agrees to go to the ED. I called and gave report to ED staff regarding her possible arrival. Rx provera taper still sent just in case. She also request pain meds for back pain. I offered ibuprofen and alleve. She declines both- stating nothing works besides tylenol short-term. I encouraged her to continue to take the tylenol until work-up is complete.       Past Medical History:   Diagnosis Date    Gestational diabetes        Past Surgical History:   Procedure Laterality Date     SECTION       SECTION, WITH TUBAL LIGATION N/A 2019    Performed [facility] A[facility]emann[facility] at [facility]D    CHOLECYSTECTOMY         Family History   Problem Relation Age of Onset    Diabetes Maternal Grandmother     Breast cancer Paternal Aunt     Colon cancer Neg Hx     Ovarian cancer Neg Hx        Social History     Tobacco Use    Smoking status: Never Smoker    Smokeless tobacco: Never Used   Substance Use Topics    Alcohol use: No     Frequency: Never    Drug use: No       OB History    Para Term  AB Living   3 3 3     3   SAB TAB Ectopic Multiple Live Births         0 3      # Outcome Date GA Lbr Evaristo/2nd Weight Sex Delivery Anes PTL Lv   3 Term 19 39w0d  3.45 kg (7 lb 9.7 oz) F CS-LTranv Spinal N YAZ   2 Term    2.92 kg (6 lb 7 oz) M CS-LTranv         Birth Comments: GDM-diet controlled      Complications: Fetal Intolerance   1 Term    2.863 kg (6 lb 5 oz) M Vag-Spont  N        Current Outpatient Medications   Medication Sig    medroxyPROGESTERone (PROVERA) 10 MG tablet 2 tablets TID x 7 days then 2 tablets daily x 21 days     No current facility-administered medications for this visit.        Allergies: Patient has no known allergies.     /74   Wt 110 kg (242 lb 8.1 oz)   LMP  (LMP Unknown)   Breastfeeding? No   BMI 42.96 kg/m²     ROS:  GENERAL: Denies fever or chills.   SKIN:  Denies rash or lesions.   HEAD: Denies head injury or headache.   CHEST: Denies chest pain or shortness of breath.   CARDIOVASCULAR: Denies palpitations or chest pain.   ABDOMEN: No constipation, diarrhea, nausea, vomiting or rectal bleeding.   URINARY: No dysuria, hematuria, or burning on urination.  REPRODUCTIVE: See HPI.   BREASTS: see HPI  NEUROLOGIC: Denies syncope or weakness.     Physical Exam:  GENERAL: alert, appears stated age and cooperative  NEUROLOGIC: orientated to person, place and time, normal mood and affect   CHEST: Normal respiratory effort  NECK: normal appearance  SKIN: no acne, hirsutism  BREAST EXAM: not examined  ABDOMEN: abdomen is soft without significant tenderness, masses  EXTERNAL GENITALIA:  normal general appearance  URETHRA: normal urethra, normal urethral meatus  VAGINA:  normal without tenderness, induration or masses, moderate amount of blood in vault  CERVIX:  closed  UTERUS:  exam limited by habitus  ADNEXA:   nontender     Diagnosis:  1. Irregular bleeding        Plan:   1. U/S and CBC/TSH ordered along with provera taper. If she doesn't report to the ED for evaluation  2.neck, back, bilateral leg pain since epidural- since >6months since placement I recommend f/u with neurology. Orders placed.     Orders Placed This Encounter    US Pelvis Comp with Transvag NON-OB (xpd    CBC auto differential    TSH    Ambulatory consult to Neurology    medroxyPROGESTERone (PROVERA) 10 MG tablet    US OB/GYN Procedure ([facility])       Mary Bush MD  OB/GYN  Pager: [phone]

## 2019-07-19 NOTE — ED PROVIDER NOTES
Encounter Date: 2019    SCRIBE #1 NOTE: I, Michelle Ahumada, am scribing for, and in the presence of,  Dr. Ricardo. I have scribed the entire note.       History     Chief Complaint   Patient presents with    Vaginal Bleeding     sent from [facility] office for CBC and us pelvis for heavy vaginal bleeding since April      This is a 28 y.o. female who presents with complaint of       The history is provided by the patient.     Review of patient's allergies indicates:  No Known Allergies  Past Medical History:   Diagnosis Date    Gestational diabetes      Past Surgical History:   Procedure Laterality Date     SECTION       SECTION, WITH TUBAL LIGATION N/A 2019    [facility] at [facility]D    CHOLECYSTECTOMY       Family History   Problem Relation Age of Onset    Diabetes Maternal Grandmother     Breast cancer Paternal Aunt     Colon cancer Neg Hx     Ovarian cancer Neg Hx      Social History     Tobacco Use    Smoking status: Never Smoker    Smokeless tobacco: Never Used   Substance Use Topics    Alcohol use: No     Frequency: Never    Drug use: No     Review of Systems   Constitutional: Negative for chills, fatigue and fever.   HENT: Negative for facial swelling, trouble swallowing and voice change.    Eyes: Negative for photophobia, pain and redness.   Respiratory: Negative for cough, choking and shortness of breath.    Cardiovascular: Negative for chest pain, palpitations and leg swelling.   Gastrointestinal: Negative for abdominal pain, diarrhea, nausea and rectal pain.   Genitourinary: Negative for dysuria, frequency and urgency.   Musculoskeletal: Negative for back pain, neck pain and neck stiffness.   Neurological: Negative for seizures, speech difficulty, light-headedness, numbness and headaches.   All other systems reviewed and are negative.      Physical Exam     Initial Vitals [19 1556]   BP Pulse Resp Temp SpO2   (!) 166/102 96 18 98.5  °F (36.9 °C) 99 %      MAP       --         Physical Exam    Nursing note and vitals reviewed.  Constitutional: She appears well-developed and well-nourished. No distress.   HENT:   Head: Normocephalic and atraumatic.   Mouth/Throat: Oropharynx is clear and moist.   Eyes: Conjunctivae and EOM are normal. Pupils are equal, round, and reactive to light.   Neck: Normal range of motion. Neck supple.   Cardiovascular: Normal rate, regular rhythm, normal heart sounds and intact distal pulses.   Pulmonary/Chest: Breath sounds normal. No respiratory distress. She has no wheezes. She has no rhonchi. She has no rales.   Abdominal: Soft. Bowel sounds are normal. She exhibits no distension. There is no tenderness.   Musculoskeletal: Normal range of motion.   Neurological: She is alert and oriented to person, place, and time. She has normal strength. No cranial nerve deficit.   Skin: Skin is warm and dry. Capillary refill takes less than 2 seconds.         ED Course   Procedures  Labs Reviewed   URINALYSIS   CBC W/ AUTO DIFFERENTIAL   COMPREHENSIVE METABOLIC PANEL   POCT URINE PREGNANCY          X-Rays:   Independently Interpreted Readings:   Other Readings:  I have visualized all imaging for this patient, radiology has done the interpretation.    Imaging Results          US Pelvis Complete Non OB (In process)               Medical Decision Making:   Clinical Tests:   Lab Tests: Ordered and Reviewed  Radiological Study: Ordered and Reviewed                      Clinical Impression:   No diagnosis found.          Scribe attestation***

## 2019-07-20 NOTE — ED TRIAGE NOTES
Pt sent to ED by ob/gyn Dr. Cobian for vaginal bleeding x 2 months. Per pt, she is post partum x 6 months and was having regular periods until 2 months ago. Per pt, she has been bleeding constantly for the past 2 months, bleeding through about 6 pads a day. Pt also complaining of back pain. Pain is 9 out of 10.

## 2019-07-20 NOTE — ED PROVIDER NOTES
Encounter Date: 2019       History     Chief Complaint   Patient presents with    Vaginal Bleeding     sent from [facility] office for CBC and us pelvis for heavy vaginal bleeding since -year-old female presents emergency department complaining of vaginal bleeding and lower back pain.  Was seen by her OB today and sent to the ER for ultrasound in [facility].  Patient reports vaginal bleeding consistent with her menses for the last 2 months, since her spontaneous vaginal delivery.  States a few days after the bleeding again she began having some lower back pain that occasionally radiates down in either leg.  Described as an aching pain that has been constant, worse certain movements and positions and without alleviating factors.  Reports fatigue but denies any lightheadedness or shortness of breath.  Denies any nausea vomiting.  Denies any abdominal pain.  No other symptoms reported at this time.        Review of patient's allergies indicates:  No Known Allergies  Past Medical History:   Diagnosis Date    Gestational diabetes      Past Surgical History:   Procedure Laterality Date     SECTION       SECTION, WITH TUBAL LIGATION N/A 2019    Performed by Michael A. Wiedemann, MD at [facility]    CHOLECYSTECTOMY       Family History   Problem Relation Age of Onset    Diabetes Maternal Grandmother     Breast cancer Paternal Aunt     Colon cancer Neg Hx     Ovarian cancer Neg Hx      Social History     Tobacco Use    Smoking status: Never Smoker    Smokeless tobacco: Never Used   Substance Use Topics    Alcohol use: No     Frequency: Never    Drug use: No     Review of Systems   Constitutional: Positive for fatigue. Negative for chills and fever.   HENT: Negative for congestion, sore throat and voice change.    Eyes: Negative for photophobia, pain and redness.   Respiratory: Negative for cough, choking and shortness of breath.    Cardiovascular: Negative for chest pain,  palpitations and leg swelling.   Gastrointestinal: Negative for abdominal pain, diarrhea, nausea and vomiting.   Genitourinary: Positive for vaginal bleeding. Negative for dysuria, frequency and urgency.   Musculoskeletal: Positive for back pain. Negative for neck pain and neck stiffness.   Neurological: Negative for light-headedness, numbness and headaches.   All other systems reviewed and are negative.      Physical Exam     Initial Vitals [07/19/19 1556]   BP Pulse Resp Temp SpO2   (!) 166/102 96 18 98.5 °F (36.9 °C) 99 %      MAP       --         Physical Exam    Nursing note and vitals reviewed.  Constitutional: She appears well-developed and well-nourished. No distress.   Somewhat pale appearing   HENT:   Head: Normocephalic and atraumatic.   Oropharynx clear; Dry MM   Eyes: Conjunctivae and EOM are normal. Pupils are equal, round, and reactive to light.   Neck: Normal range of motion. Neck supple. No tracheal deviation present.   Cardiovascular: Normal rate, regular rhythm, normal heart sounds and intact distal pulses.   Pulmonary/Chest: Breath sounds normal. No respiratory distress. She has no wheezes. She has no rhonchi. She has no rales.   Abdominal: Soft. Bowel sounds are normal. She exhibits no distension. There is no tenderness.   Musculoskeletal: Normal range of motion. She exhibits no edema or tenderness.   Neurological: She is alert and oriented to person, place, and time. She has normal strength. No cranial nerve deficit.   Skin: Skin is warm and dry. Capillary refill takes less than 2 seconds.         ED Course   Procedures  Labs Reviewed   URINALYSIS - Abnormal; Notable for the following components:       Result Value    Color, UA Red (*)     Appearance, UA Cloudy (*)     Specific Gravity, UA >=1.030 (*)     Protein, UA 1+ (*)     Bilirubin (UA) 1+ (*)     Occult Blood UA 3+ (*)     All other components within normal limits   CBC W/ AUTO DIFFERENTIAL - Abnormal; Notable for the following  components:    Mean Corpuscular Volume 76 (*)     Mean Corpuscular Hemoglobin 24.3 (*)     RDW 14.7 (*)     All other components within normal limits   COMPREHENSIVE METABOLIC PANEL - Abnormal; Notable for the following components:    AST 41 (*)     ALT 75 (*)     All other components within normal limits   URINALYSIS MICROSCOPIC - Abnormal; Notable for the following components:    RBC, UA >100 (*)     Bacteria Few (*)     All other components within normal limits   C. TRACHOMATIS/N. GONORRHOEAE BY AMP DNA   POCT URINE PREGNANCY   TISSUE SPECIMEN TO PATHOLOGY, EMERGENCY DEPARTMENT            X-Rays:   Independently Interpreted Readings:   Other Readings:  Imaging interpreted by radiologist and visualized by me    Imaging Results          US Pelvis Complete Non OB (Final result)  Result time 07/19/19 18:17:24    Final result by Arnold Earl MD (07/19/19 18:17:24)                 Impression:      Abnormal endometrial thickening, possibly related to endometrial hyperplasia.  Ob GYN follow-up is recommended.      Electronically signed by: Arnold Earl MD  Date:    07/19/2019  Time:    18:17             Narrative:    EXAMINATION:  US PELVIS COMPLETE NON OB    CLINICAL HISTORY:  Vaginal bleeding;    TECHNIQUE:  Transabdominal sonography of the pelvis was performed, followed by transvaginal sonography to better evaluate the uterus and ovaries.    COMPARISON:  None.    FINDINGS:  The uterus measures 9.6 x 5.0 x 5.3 cm. Uterine parenchyma is homogeneous without evidence for masses. The endometrial echo complex is thickened and measures 20 mm.  There is suspected more prominent endometrial thickening seen in the fundal region measuring upwards of 30 mm.    The right ovary measures 3.8 x 2.1 x 2.8 cm. The left ovary measures 4.5 x 2.5 x 2.4 cm. Arterial and venous flow are preserved bilaterally.  No significant free fluid is seen.                                Medical Decision Making:   Initial Assessment:    28-year-old female presents emergency department complaining of vaginal bleeding and low back pain  Differential Diagnosis:   Blood loss anemia, pregnancy, retained products, fracture, dislocation, contusion, sprain, strain.  Independently Interpreted Test(s):   I have ordered and independently interpreted X-rays - see prior notes.  Clinical Tests:   Lab Tests: Reviewed       <> Summary of Lab: Benign  ED Management:  Dr. Bush was kind enough to follow along the patient's progress in the emergency room and called over and informed us of the plan to do an endometrial biopsy in the department.  Patient tolerated this well. I have given the patient some IV fluid and Compazine and Toradol and she is feeling somewhat better.  Informed her of results as well as plan to discharge with prescription for Flexeril, follow up with her OB as scheduled, home management techniques, reasons to return to the emergency room.                      Clinical Impression:       ICD-10-CM ICD-9-CM   1. Vaginal bleeding N93.9 623.8   2. Acute bilateral low back pain with bilateral sciatica M54.42 724.2    M54.41 724.3         Disposition:   Disposition: Discharged  Condition: Stable                        Ajit Ricardo MD  07/19/19 9139

## 2019-07-20 NOTE — PROGRESS NOTES
U/S and lab work reviewed. Discussed with patient indication for EMBX- preferably before starting progesterone taper to stop bleeding. Consents reviewed and signed.     DATE: July19th, 2019    TIME: 7:05 PM    PROCEDURE: Endometrial biopsy    INDICATION: AUB-N    PATIENT CONSENT:     PRE ENDOMETRIAL BIOPSY COUNSELING:The patient was informed of the risk of bleeding, infection, uterine perforation and pain and that the test will rule-out endometrial cancer with accuracy greater than 95%. She was counseled on the alternatives to endometrial biopsy.  All of her questions were answered. Counseling lasted approximately 15 minutes and all her questions were answered.    Patient gives written consent    ANESTHESIA: None    Labs: UPT performed prior to the procedure was negative.    PROCEDURE NOTE:  The cervix was visualized with a speculum and swabbed with Betadinex3.  The anterior lip of the cervix was grasped with a single toothed tenaculum, and a sterile endometrial pipelle was inserted into the uterus to a sound length of 9 cm. 2 passes were made with the pipelle and moderate amount of tissue was obtained. The specimen was placed in formalin and sent to Pathology for evaluation.     COMPLICATIONS: None    DISPOSITION: The patient tolerated the above procedure well.    POST ENDOMETRIAL BIOPSY COUNSELING:  - Manage post biopsy cramping with NSAIDs or Tylenol.  - Expect spotting or light bleeding for a few days.  - Report bleeding heavier than a period, fever > 101.0 F, worsening pain or a foul smelling vaginal discharge.    Follow-up pending biopsy results     Mary Bush MD, FACOG  OB/GYN  Pager: [phone]

## 2019-07-20 NOTE — DISCHARGE INSTRUCTIONS
Please make sure you are drinking plenty of fluids, such as gatorade g2 or powerade light. In the morning I recommend you begin taking ibuprofen 600mg every 8 hours with food as needed for back pain.

## 2019-07-21 LAB
C TRACH DNA SPEC QL NAA+PROBE: NOT DETECTED
N GONORRHOEA DNA SPEC QL NAA+PROBE: NOT DETECTED

## 2019-08-26 RX ORDER — MEDROXYPROGESTERONE ACETATE 10 MG/1
TABLET ORAL
Qty: 84 TABLET | Refills: 0 | OUTPATIENT
Start: 2019-08-26

## 2019-08-27 NOTE — TELEPHONE ENCOUNTER
Appointment Request From: Danya Chino      With Provider: Michael A Wiedemann, MD [[place] - OB/GYN]      Preferred Date Range: 8/29/2019 - 8/31/2019      Preferred Times: Any time      Reason for visit: Bleeding since 3 months      Comments:   I have bin having Vaginal bleeding since the past 3 months

## 2021-04-16 ENCOUNTER — PATIENT MESSAGE (OUTPATIENT)
Dept: RESEARCH | Facility: HOSPITAL | Age: 31
End: 2021-04-16

## 2021-06-10 NOTE — ADDENDUM NOTE
Addended by: ORLANDO BURNS on: 1/10/2019 10:52 AM     Modules accepted: Orders     Pt states that she is having swelling to both legs and has a history of having blood clots and is worried she might have another     Just off blood thinners in the last 2 weeks

## (undated) DEVICE — STAPLES RELOAD

## (undated) DEVICE — DRESSING AQUACEL AG ADV 3.5X12